# Patient Record
Sex: FEMALE | Race: WHITE | HISPANIC OR LATINO | Employment: FULL TIME | URBAN - METROPOLITAN AREA
[De-identification: names, ages, dates, MRNs, and addresses within clinical notes are randomized per-mention and may not be internally consistent; named-entity substitution may affect disease eponyms.]

---

## 2021-10-21 ENCOUNTER — OFFICE VISIT (OUTPATIENT)
Dept: OBGYN CLINIC | Age: 46
End: 2021-10-21
Payer: COMMERCIAL

## 2021-10-21 VITALS
HEIGHT: 65 IN | DIASTOLIC BLOOD PRESSURE: 84 MMHG | BODY MASS INDEX: 34.99 KG/M2 | WEIGHT: 210 LBS | SYSTOLIC BLOOD PRESSURE: 122 MMHG

## 2021-10-21 DIAGNOSIS — Z12.31 SCREENING MAMMOGRAM FOR BREAST CANCER: ICD-10-CM

## 2021-10-21 DIAGNOSIS — Z12.11 COLON CANCER SCREENING: ICD-10-CM

## 2021-10-21 DIAGNOSIS — Z01.411 ENCOUNTER FOR GYNECOLOGICAL EXAMINATION WITH ABNORMAL FINDING: Primary | ICD-10-CM

## 2021-10-21 DIAGNOSIS — N89.8 VAGINAL ODOR: ICD-10-CM

## 2021-10-21 PROCEDURE — G0476 HPV COMBO ASSAY CA SCREEN: HCPCS | Performed by: NURSE PRACTITIONER

## 2021-10-21 PROCEDURE — G0145 SCR C/V CYTO,THINLAYER,RESCR: HCPCS | Performed by: PATHOLOGY

## 2021-10-21 PROCEDURE — G0124 SCREEN C/V THIN LAYER BY MD: HCPCS | Performed by: PATHOLOGY

## 2021-10-21 PROCEDURE — 99386 PREV VISIT NEW AGE 40-64: CPT | Performed by: NURSE PRACTITIONER

## 2021-10-21 RX ORDER — ADALIMUMAB 40MG/0.4ML
KIT SUBCUTANEOUS
COMMUNITY
Start: 2021-09-30 | End: 2022-05-02

## 2021-10-21 RX ORDER — METRONIDAZOLE 7.5 MG/G
1 GEL VAGINAL
Qty: 45 G | Refills: 0 | Status: SHIPPED | OUTPATIENT
Start: 2021-10-21 | End: 2021-10-26

## 2021-10-22 LAB
HPV HR 12 DNA CVX QL NAA+PROBE: NEGATIVE
HPV16 DNA CVX QL NAA+PROBE: NEGATIVE
HPV18 DNA CVX QL NAA+PROBE: NEGATIVE

## 2021-10-28 ENCOUNTER — TELEPHONE (OUTPATIENT)
Dept: OBGYN CLINIC | Facility: CLINIC | Age: 46
End: 2021-10-28

## 2021-10-28 LAB
LAB AP GYN PRIMARY INTERPRETATION: ABNORMAL
PATH INTERP SPEC-IMP: ABNORMAL

## 2021-12-28 ENCOUNTER — OFFICE VISIT (OUTPATIENT)
Dept: URGENT CARE | Facility: CLINIC | Age: 46
End: 2021-12-28
Payer: COMMERCIAL

## 2021-12-28 ENCOUNTER — APPOINTMENT (OUTPATIENT)
Dept: RADIOLOGY | Facility: CLINIC | Age: 46
End: 2021-12-28
Payer: COMMERCIAL

## 2021-12-28 VITALS
RESPIRATION RATE: 22 BRPM | HEIGHT: 65 IN | HEART RATE: 78 BPM | OXYGEN SATURATION: 95 % | WEIGHT: 210 LBS | BODY MASS INDEX: 34.99 KG/M2 | TEMPERATURE: 99.4 F

## 2021-12-28 DIAGNOSIS — R05.9 COUGH: Primary | ICD-10-CM

## 2021-12-28 DIAGNOSIS — R05.9 COUGH: ICD-10-CM

## 2021-12-28 DIAGNOSIS — R06.02 SHORTNESS OF BREATH: ICD-10-CM

## 2021-12-28 PROCEDURE — 87636 SARSCOV2 & INF A&B AMP PRB: CPT

## 2021-12-28 PROCEDURE — 99213 OFFICE O/P EST LOW 20 MIN: CPT

## 2021-12-28 PROCEDURE — 71046 X-RAY EXAM CHEST 2 VIEWS: CPT

## 2021-12-28 RX ORDER — ALBUTEROL SULFATE 2.5 MG/3ML
2.5 SOLUTION RESPIRATORY (INHALATION) EVERY 4 HOURS PRN
COMMUNITY
Start: 2021-12-23 | End: 2022-12-23

## 2021-12-28 RX ORDER — ALBUTEROL SULFATE 90 UG/1
2 AEROSOL, METERED RESPIRATORY (INHALATION) EVERY 6 HOURS PRN
COMMUNITY
Start: 2021-12-23 | End: 2022-01-19 | Stop reason: SDUPTHER

## 2021-12-28 RX ORDER — BENZONATATE 100 MG/1
100 CAPSULE ORAL 3 TIMES DAILY PRN
Qty: 20 CAPSULE | Refills: 0 | Status: SHIPPED | OUTPATIENT
Start: 2021-12-28 | End: 2022-01-19

## 2021-12-28 RX ORDER — BUDESONIDE 180 UG/1
2 AEROSOL, POWDER RESPIRATORY (INHALATION) 2 TIMES DAILY
Qty: 1 EACH | Refills: 0 | Status: SHIPPED | OUTPATIENT
Start: 2021-12-28 | End: 2022-01-19

## 2021-12-30 RX ORDER — MOMETASONE FUROATE 220 UG/1
INHALANT RESPIRATORY (INHALATION)
Qty: 1 EACH | Refills: 0 | OUTPATIENT
Start: 2021-12-30

## 2021-12-31 ENCOUNTER — TELEPHONE (OUTPATIENT)
Dept: URGENT CARE | Facility: CLINIC | Age: 46
End: 2021-12-31

## 2021-12-31 LAB
FLUAV RNA RESP QL NAA+PROBE: NEGATIVE
FLUBV RNA RESP QL NAA+PROBE: NEGATIVE
SARS-COV-2 RNA RESP QL NAA+PROBE: POSITIVE

## 2022-01-03 ENCOUNTER — APPOINTMENT (EMERGENCY)
Dept: CT IMAGING | Facility: HOSPITAL | Age: 47
End: 2022-01-03
Payer: COMMERCIAL

## 2022-01-03 ENCOUNTER — HOSPITAL ENCOUNTER (EMERGENCY)
Facility: HOSPITAL | Age: 47
Discharge: HOME | End: 2022-01-03
Attending: EMERGENCY MEDICINE | Admitting: EMERGENCY MEDICINE
Payer: COMMERCIAL

## 2022-01-03 VITALS
TEMPERATURE: 101 F | HEART RATE: 91 BPM | DIASTOLIC BLOOD PRESSURE: 68 MMHG | RESPIRATION RATE: 20 BRPM | OXYGEN SATURATION: 94 % | SYSTOLIC BLOOD PRESSURE: 110 MMHG

## 2022-01-03 DIAGNOSIS — U07.1 COVID-19: Primary | ICD-10-CM

## 2022-01-03 DIAGNOSIS — J45.901 ASTHMA EXACERBATION: ICD-10-CM

## 2022-01-03 DIAGNOSIS — R07.9 CHEST PAIN: ICD-10-CM

## 2022-01-03 LAB
2HR DELTA HS TROPONIN: 1 NG/L
ALBUMIN SERPL BCP-MCNC: 3 G/DL (ref 3.5–5)
ALP SERPL-CCNC: 86 U/L (ref 46–116)
ALT SERPL W P-5'-P-CCNC: 36 U/L (ref 12–78)
ANION GAP SERPL CALCULATED.3IONS-SCNC: 11 MMOL/L (ref 4–13)
AST SERPL W P-5'-P-CCNC: 48 U/L (ref 5–45)
BASOPHILS # BLD MANUAL: 0 THOUSAND/UL (ref 0–0.1)
BASOPHILS NFR MAR MANUAL: 0 % (ref 0–1)
BILIRUB SERPL-MCNC: 0.32 MG/DL (ref 0.2–1)
BUN SERPL-MCNC: 6 MG/DL (ref 5–25)
CALCIUM ALBUM COR SERPL-MCNC: 8.9 MG/DL (ref 8.3–10.1)
CALCIUM SERPL-MCNC: 8.1 MG/DL (ref 8.3–10.1)
CARDIAC TROPONIN I PNL SERPL HS: 4 NG/L
CARDIAC TROPONIN I PNL SERPL HS: 5 NG/L
CHLORIDE SERPL-SCNC: 101 MMOL/L (ref 100–108)
CO2 SERPL-SCNC: 27 MMOL/L (ref 21–32)
CREAT SERPL-MCNC: 0.87 MG/DL (ref 0.6–1.3)
D DIMER PPP FEU-MCNC: 0.87 UG/ML FEU
EOSINOPHIL # BLD MANUAL: 0.12 THOUSAND/UL (ref 0–0.4)
EOSINOPHIL NFR BLD MANUAL: 2 % (ref 0–6)
ERYTHROCYTE [DISTWIDTH] IN BLOOD BY AUTOMATED COUNT: 13.2 % (ref 11.6–15.1)
GFR SERPL CREATININE-BSD FRML MDRD: 79 ML/MIN/1.73SQ M
GLUCOSE SERPL-MCNC: 104 MG/DL (ref 65–140)
HCT VFR BLD AUTO: 38.3 % (ref 34.8–46.1)
HGB BLD-MCNC: 12.8 G/DL (ref 11.5–15.4)
LYMPHOCYTES # BLD AUTO: 0.49 THOUSAND/UL (ref 0.6–4.47)
LYMPHOCYTES # BLD AUTO: 8 % (ref 14–44)
MCH RBC QN AUTO: 29.7 PG (ref 26.8–34.3)
MCHC RBC AUTO-ENTMCNC: 33.4 G/DL (ref 31.4–37.4)
MCV RBC AUTO: 89 FL (ref 82–98)
MONOCYTES # BLD AUTO: 0.25 THOUSAND/UL (ref 0–1.22)
MONOCYTES NFR BLD: 4 % (ref 4–12)
NEUTROPHILS # BLD MANUAL: 5.31 THOUSAND/UL (ref 1.85–7.62)
NEUTS BAND NFR BLD MANUAL: 2 % (ref 0–8)
NEUTS SEG NFR BLD AUTO: 84 % (ref 43–75)
PLATELET # BLD AUTO: 179 THOUSANDS/UL (ref 149–390)
PLATELET BLD QL SMEAR: ADEQUATE
PMV BLD AUTO: 9.7 FL (ref 8.9–12.7)
POTASSIUM SERPL-SCNC: 3.4 MMOL/L (ref 3.5–5.3)
PROT SERPL-MCNC: 7 G/DL (ref 6.4–8.2)
RBC # BLD AUTO: 4.31 MILLION/UL (ref 3.81–5.12)
SODIUM SERPL-SCNC: 139 MMOL/L (ref 136–145)
WBC # BLD AUTO: 6.17 THOUSAND/UL (ref 4.31–10.16)

## 2022-01-03 PROCEDURE — 85027 COMPLETE CBC AUTOMATED: CPT | Performed by: EMERGENCY MEDICINE

## 2022-01-03 PROCEDURE — 93005 ELECTROCARDIOGRAM TRACING: CPT

## 2022-01-03 PROCEDURE — 71275 CT ANGIOGRAPHY CHEST: CPT

## 2022-01-03 PROCEDURE — 99285 EMERGENCY DEPT VISIT HI MDM: CPT

## 2022-01-03 PROCEDURE — 84484 ASSAY OF TROPONIN QUANT: CPT | Performed by: EMERGENCY MEDICINE

## 2022-01-03 PROCEDURE — 96361 HYDRATE IV INFUSION ADD-ON: CPT

## 2022-01-03 PROCEDURE — 85007 BL SMEAR W/DIFF WBC COUNT: CPT | Performed by: EMERGENCY MEDICINE

## 2022-01-03 PROCEDURE — 99285 EMERGENCY DEPT VISIT HI MDM: CPT | Performed by: PHYSICIAN ASSISTANT

## 2022-01-03 PROCEDURE — 85379 FIBRIN DEGRADATION QUANT: CPT | Performed by: PHYSICIAN ASSISTANT

## 2022-01-03 PROCEDURE — 96376 TX/PRO/DX INJ SAME DRUG ADON: CPT

## 2022-01-03 PROCEDURE — 36415 COLL VENOUS BLD VENIPUNCTURE: CPT

## 2022-01-03 PROCEDURE — 80053 COMPREHEN METABOLIC PANEL: CPT | Performed by: EMERGENCY MEDICINE

## 2022-01-03 PROCEDURE — 96374 THER/PROPH/DIAG INJ IV PUSH: CPT

## 2022-01-03 RX ORDER — POTASSIUM CHLORIDE 20 MEQ/1
20 TABLET, EXTENDED RELEASE ORAL ONCE
Status: COMPLETED | OUTPATIENT
Start: 2022-01-03 | End: 2022-01-03

## 2022-01-03 RX ORDER — ALBUTEROL SULFATE 2.5 MG/3ML
2.5 SOLUTION RESPIRATORY (INHALATION) EVERY 6 HOURS PRN
Qty: 75 ML | Refills: 0 | Status: SHIPPED | OUTPATIENT
Start: 2022-01-03 | End: 2022-01-19 | Stop reason: SDUPTHER

## 2022-01-03 RX ORDER — KETOROLAC TROMETHAMINE 30 MG/ML
15 INJECTION, SOLUTION INTRAMUSCULAR; INTRAVENOUS ONCE
Status: COMPLETED | OUTPATIENT
Start: 2022-01-03 | End: 2022-01-03

## 2022-01-03 RX ORDER — ACETAMINOPHEN 325 MG/1
975 TABLET ORAL ONCE
Status: COMPLETED | OUTPATIENT
Start: 2022-01-03 | End: 2022-01-03

## 2022-01-03 RX ORDER — ALBUTEROL SULFATE 90 UG/1
2 AEROSOL, METERED RESPIRATORY (INHALATION) ONCE
Status: COMPLETED | OUTPATIENT
Start: 2022-01-03 | End: 2022-01-03

## 2022-01-03 RX ORDER — ONDANSETRON 4 MG/1
4 TABLET, ORALLY DISINTEGRATING ORAL ONCE
Status: COMPLETED | OUTPATIENT
Start: 2022-01-03 | End: 2022-01-03

## 2022-01-03 RX ORDER — PREDNISONE 50 MG/1
50 TABLET ORAL DAILY
Qty: 4 TABLET | Refills: 0 | Status: SHIPPED | OUTPATIENT
Start: 2022-01-04 | End: 2022-01-08

## 2022-01-03 RX ADMIN — POTASSIUM CHLORIDE 20 MEQ: 1500 TABLET, EXTENDED RELEASE ORAL at 09:40

## 2022-01-03 RX ADMIN — IOHEXOL 100 ML: 350 INJECTION, SOLUTION INTRAVENOUS at 09:52

## 2022-01-03 RX ADMIN — KETOROLAC TROMETHAMINE 15 MG: 30 INJECTION, SOLUTION INTRAMUSCULAR at 09:40

## 2022-01-03 RX ADMIN — ONDANSETRON 4 MG: 4 TABLET, ORALLY DISINTEGRATING ORAL at 06:19

## 2022-01-03 RX ADMIN — ALBUTEROL SULFATE 2 PUFF: 90 AEROSOL, METERED RESPIRATORY (INHALATION) at 10:03

## 2022-01-03 RX ADMIN — PREDNISONE 50 MG: 20 TABLET ORAL at 10:39

## 2022-01-03 RX ADMIN — SODIUM CHLORIDE 1000 ML: 0.9 INJECTION, SOLUTION INTRAVENOUS at 09:25

## 2022-01-03 RX ADMIN — KETOROLAC TROMETHAMINE 15 MG: 30 INJECTION, SOLUTION INTRAMUSCULAR at 10:40

## 2022-01-03 RX ADMIN — ACETAMINOPHEN 975 MG: 325 TABLET, FILM COATED ORAL at 10:39

## 2022-01-03 NOTE — ED PROVIDER NOTES
History  Chief Complaint   Patient presents with    Chest Pain     pt c/o worsening cp since last night, pt covid symptoms started 12/26  pt also c/o body aches and fevers at home  pt took neb tx and advil at 64     51yo female with a history of asthma and rheumatoid arthritis currently untreated presenting for evaluation of chest pain  Patient was diagnosed with COVID-19 on 12/28/21  She reports continuing symptoms including fevers, chills, and body aches  Patient also reports intermittent left sided chest pain which feels like a muscle spasm  She denies any alleviating or provoking factors  Patient also reports a dry cough and shortness of breath  Due to her worsening symptoms, patient decided to seek medical attention  She did not receive the COVID vaccine  History provided by:  Medical records and patient   used: No    Chest Pain  Pain location:  L chest  Pain quality comment:  Spasm  Pain radiates to:  Does not radiate  Pain severity:  Moderate  Onset quality:  Gradual  Timing:  Intermittent  Progression:  Worsening  Chronicity:  New  Relieved by:  Nothing  Worsened by:  Nothing tried  Associated symptoms: anxiety, cough, fatigue, fever, shortness of breath and weakness    Associated symptoms: no abdominal pain, no altered mental status, no diaphoresis, no lower extremity edema, no nausea, no numbness, no palpitations, no syncope and not vomiting        Prior to Admission Medications   Prescriptions Last Dose Informant Patient Reported? Taking?    Humira Pen 40 MG/0 4ML PNKT   Yes No   Patient not taking: Reported on 12/28/2021    albuterol (2 5 mg/3 mL) 0 083 % nebulizer solution   Yes No   Sig: Inhale 2 5 mg every 4 (four) hours as needed   albuterol (PROVENTIL HFA,VENTOLIN HFA) 90 mcg/act inhaler   Yes No   Sig: Inhale 2 puffs every 6 (six) hours as needed   benzonatate (TESSALON PERLES) 100 mg capsule   No No   Sig: Take 1 capsule (100 mg total) by mouth 3 (three) times a day as needed for cough   budesonide (Pulmicort Flexhaler) 180 MCG/ACT inhaler   No No   Sig: Inhale 2 puffs 2 (two) times a day for 14 days Rinse mouth after use  Facility-Administered Medications: None       History reviewed  No pertinent past medical history  Past Surgical History:   Procedure Laterality Date    STOMACH SURGERY      TONSILLECTOMY      TUBAL LIGATION         Family History   Problem Relation Age of Onset    Breast cancer Mother         Masectomy    Breast cancer Maternal Aunt         Ymph node removal     I have reviewed and agree with the history as documented  E-Cigarette/Vaping    E-Cigarette Use Never User      E-Cigarette/Vaping Substances    Nicotine No     THC No     CBD No     Flavoring No     Other No     Unknown No      Social History     Tobacco Use    Smoking status: Former Smoker    Smokeless tobacco: Current User   Vaping Use    Vaping Use: Never used   Substance Use Topics    Alcohol use: Yes    Drug use: Not Currently       Review of Systems   Constitutional: Positive for activity change, appetite change, chills, fatigue and fever  Negative for diaphoresis  HENT: Positive for congestion  Negative for drooling and voice change  Eyes: Negative for discharge and redness  Respiratory: Positive for cough and shortness of breath  Negative for stridor  Cardiovascular: Positive for chest pain  Negative for palpitations, leg swelling and syncope  Gastrointestinal: Negative for abdominal pain, nausea and vomiting  Musculoskeletal: Negative for neck pain and neck stiffness  Skin: Negative for color change and rash  Neurological: Positive for weakness  Negative for seizures, syncope and numbness  Psychiatric/Behavioral: Negative for confusion  The patient is not nervous/anxious  All other systems reviewed and are negative  Physical Exam  Physical Exam  Vitals and nursing note reviewed     Constitutional:       General: She is not in acute distress  Appearance: She is well-developed  She is not diaphoretic  Comments: Non-toxic appearing   HENT:      Head: Normocephalic and atraumatic  Right Ear: External ear normal       Left Ear: External ear normal       Nose: Nose normal    Eyes:      General: No scleral icterus  Right eye: No discharge  Left eye: No discharge  Conjunctiva/sclera: Conjunctivae normal    Cardiovascular:      Rate and Rhythm: Normal rate and regular rhythm  Heart sounds: Normal heart sounds  No murmur heard  Pulmonary:      Effort: Pulmonary effort is normal  No respiratory distress  Breath sounds: No stridor  Wheezing present  No rales  Comments: Frequent dry cough  Abdominal:      General: Bowel sounds are normal  There is no distension  Palpations: Abdomen is soft  Tenderness: There is no abdominal tenderness  There is no guarding  Musculoskeletal:         General: No deformity  Normal range of motion  Cervical back: Normal range of motion and neck supple  Lymphadenopathy:      Cervical: No cervical adenopathy  Skin:     General: Skin is warm and dry  Neurological:      Mental Status: She is alert  She is not disoriented  GCS: GCS eye subscore is 4  GCS verbal subscore is 5  GCS motor subscore is 6     Psychiatric:         Behavior: Behavior normal          Vital Signs  ED Triage Vitals   Temperature Pulse Respirations Blood Pressure SpO2   01/03/22 0609 01/03/22 0604 01/03/22 0608 01/03/22 0608 01/03/22 0604   (!) 101 °F (38 3 °C) 103 20 129/73 93 %      Temp Source Heart Rate Source Patient Position - Orthostatic VS BP Location FiO2 (%)   01/03/22 0609 01/03/22 0608 01/03/22 0608 01/03/22 0608 --   Oral Monitor Sitting Left arm       Pain Score       01/03/22 0940       8           Vitals:    01/03/22 0604 01/03/22 0608 01/03/22 1054   BP:  129/73 110/68   Pulse: 103 104 91   Patient Position - Orthostatic VS:  Sitting          Visual Acuity      ED Medications  Medications   ondansetron (ZOFRAN-ODT) dispersible tablet 4 mg (4 mg Oral Given 1/3/22 0619)   potassium chloride (K-DUR,KLOR-CON) CR tablet 20 mEq (20 mEq Oral Given 1/3/22 0940)   sodium chloride 0 9 % bolus 1,000 mL (0 mL Intravenous Stopped 1/3/22 1054)   ketorolac (TORADOL) injection 15 mg (15 mg Intravenous Given 1/3/22 0940)   albuterol (PROVENTIL HFA,VENTOLIN HFA) inhaler 2 puff (2 puffs Inhalation Given 1/3/22 1003)   iohexol (OMNIPAQUE) 350 MG/ML injection (SINGLE-DOSE) 100 mL (100 mL Intravenous Given 1/3/22 0952)   ketorolac (TORADOL) injection 15 mg (15 mg Intravenous Given 1/3/22 1040)   acetaminophen (TYLENOL) tablet 975 mg (975 mg Oral Given 1/3/22 1039)   predniSONE tablet 50 mg (50 mg Oral Given 1/3/22 1039)       Diagnostic Studies  Results Reviewed     Procedure Component Value Units Date/Time    HS Troponin I 2hr [[id number]] Collected: 01/03/22 0925    Lab Status: Final result Specimen: Blood from Arm, Left Updated: 01/03/22 0955     hs TnI 2hr 5 ng/L      Delta 2hr hsTnI 1 ng/L     D-dimer, quantitative [[id number]]  (Abnormal) Collected: 01/03/22 0618    Lab Status: Final result Specimen: Blood from Arm, Left Updated: 01/03/22 0923     D-Dimer, Quant 0 87 ug/ml FEU     CBC and differential [[id number]]  (Normal) Collected: 01/03/22 0618    Lab Status: Final result Specimen: Blood from Arm, Left Updated: 01/03/22 0701     WBC 6 17 Thousand/uL      RBC 4 31 Million/uL      Hemoglobin 12 8 g/dL      Hematocrit 38 3 %      MCV 89 fL      MCH 29 7 pg      MCHC 33 4 g/dL      RDW 13 2 %      MPV 9 7 fL      Platelets 436 Thousands/uL     Narrative: This is an appended report  These results have been appended to a previously verified report      Manual Differential(PHLEBS Do Not Order) [[id number]]  (Abnormal) Collected: 01/03/22 0618    Lab Status: Final result Specimen: Blood from Arm, Left Updated: 01/03/22 0701     Segmented % 84 %      Bands % 2 %      Lymphocytes % 8 % Monocytes % 4 %      Eosinophils, % 2 %      Basophils % 0 %      Absolute Neutrophils 5 31 Thousand/uL      Lymphocytes Absolute 0 49 Thousand/uL      Monocytes Absolute 0 25 Thousand/uL      Eosinophils Absolute 0 12 Thousand/uL      Basophils Absolute 0 00 Thousand/uL      Total Counted --     Platelet Estimate Adequate    HS Troponin 0hr (reflex protocol) [[id number]]  (Normal) Collected: 01/03/22 0618    Lab Status: Final result Specimen: Blood from Arm, Left Updated: 01/03/22 0652     hs TnI 0hr 4 ng/L     Comprehensive metabolic panel [[id number]]  (Abnormal) Collected: 01/03/22 0618    Lab Status: Final result Specimen: Blood from Arm, Left Updated: 01/03/22 0645     Sodium 139 mmol/L      Potassium 3 4 mmol/L      Chloride 101 mmol/L      CO2 27 mmol/L      ANION GAP 11 mmol/L      BUN 6 mg/dL      Creatinine 0 87 mg/dL      Glucose 104 mg/dL      Calcium 8 1 mg/dL      Corrected Calcium 8 9 mg/dL      AST 48 U/L      ALT 36 U/L      Alkaline Phosphatase 86 U/L      Total Protein 7 0 g/dL      Albumin 3 0 g/dL      Total Bilirubin 0 32 mg/dL      eGFR 79 ml/min/1 73sq m     Narrative:      [place] guidelines for Chronic Kidney Disease (CKD):     Stage 1 with normal or high GFR (GFR > 90 mL/min/1 73 square meters)    Stage 2 Mild CKD (GFR = 60-89 mL/min/1 73 square meters)    Stage 3A Moderate CKD (GFR = 45-59 mL/min/1 73 square meters)    Stage 3B Moderate CKD (GFR = 30-44 mL/min/1 73 square meters)    Stage 4 Severe CKD (GFR = 15-29 mL/min/1 73 square meters)    Stage 5 End Stage CKD (GFR <15 mL/min/1 73 square meters)  Note: GFR calculation is accurate only with a steady state creatinine                 CTA ED chest PE study   Final Result by Josephine Saravia MD (01/03 1014)      No pulmonary embolus  Moderate bilateral nodular predominantly peripheral groundglass opacity and mild right lower lobe paraspinal consolidation due to Covid-19        Mild mediastinal and right infrahilar lymphadenopathy, likely reactive  Trace right effusion  Workstation performed: [id number]                    Procedures  ECG 12 Lead Documentation Only    Date/Time: 1/3/2022 3:36 PM  Performed by: Melissa Flores PA-C  Authorized by: Melissa Flores PA-C     Indications / Diagnosis:  CP  ECG reviewed by me, the ED Provider: yes    Patient location:  ED  Interpretation:     Interpretation: normal    Rate:     ECG rate:  99    ECG rate assessment: normal    Rhythm:     Rhythm: sinus rhythm    Ectopy:     Ectopy: none    QRS:     QRS axis:  Normal  Conduction:     Conduction: normal    ST segments:     ST segments:  Normal  T waves:     T waves: normal               ED Course                     MDM  Number of Diagnoses or Management Options  Asthma exacerbation: new and requires workup  Chest pain: new and requires workup  COVID-19: new and requires workup  Diagnosis management comments: 52yo female presenting for chest pain and URI symptoms  Tested positive for COVID last week  C/o worsening symptoms  She is febrile to 101 on arrival  Oxygen saturation in mid 90s  Patient is non-toxic appearing  She has wheezing on exam but pulmonary effort is normal  Differential diagnosis includes but is not limited to: COVID, pneumonia, asthma exacerbation, ACS, PE    Initial ED plan: Cardiac labs checked in triage  High sensitivity troponin is normal  Potassium is mildly low at 3 4 which was repleted  White count normal and renal function at baseline  EKG has no ischemic changes  Will repeat troponin and add on D-dimer  IV fluid bolus and Toradol for body aches/fever  Final assessment: Repeat troponin unchanged  D-dimer elevated and CTA chest subsequently ordered  No evidence of pulmonary embolism on CT  There is evidence of COVID pneumonia  No episodes of hypoxia while in ED  No indication for admission at this time  She was started on a course of prednisone   Advised close PCP follow-up  ED return precautions discussed  Patient expressed understanding and is agreeable to plan  Patient discharged in stable condition  Amount and/or Complexity of Data Reviewed  Clinical lab tests: ordered and reviewed  Tests in the radiology section of CPT®: ordered and reviewed  Tests in the medicine section of CPT®: ordered and reviewed  Review and summarize past medical records: yes  Independent visualization of images, tracings, or specimens: yes    Risk of Complications, Morbidity, and/or Mortality  Presenting problems: moderate  Diagnostic procedures: moderate  Management options: moderate    Patient Progress  Patient progress: stable      Disposition  Final diagnoses:   COVID-19   Chest pain   Asthma exacerbation     Time reflects when diagnosis was documented in both MDM as applicable and the Disposition within this note     Time User Action Codes Description Comment    1/3/2022 10:22 AM [facility] [employer] [place], [place] [[id number]] COVID-19     [facility]2022 10:[id number][facility] Chest pain     1/3/2022 10:23 AM [facility] [employer] [facility] [place] [[id number]] Asthma exacerbation       ED Disposition     ED Disposition Condition Date/Time Comment    Discharge Stable Mon Erasto 3, 2022 10:22 AM Rachelle Martinez discharge to home/self care              Follow-up Information     Follow up With Specialties Details Why Contact Info Additional Information    Kristin Pak MD Internal Medicine Schedule an appointment as soon as possible for a visit   [address] [address] Emergency Department Emergency Medicine  If symptoms worsen [address] Emergency Department, [address] [place] [address], [address]          Discharge Medication List as of 1/3/2022 10:25 AM      START taking these medications    Details   !! albuterol (2 5 mg/3 mL) 0 083 % nebulizer solution Take 3 mL (2 5 mg total) by nebulization every 6 (six) hours as needed for wheezing or shortness of breath, Starting Mon 1/3/2022, Normal      predniSONE 50 mg tablet Take 1 tablet (50 mg total) by mouth daily for 4 days, Starting Tue 1/4/2022, Until Sat 1/8/2022, Normal       !! - Potential duplicate medications found  Please discuss with provider  CONTINUE these medications which have NOT CHANGED    Details   !! albuterol (2 5 mg/3 mL) 0 083 % nebulizer solution Inhale 2 5 mg every 4 (four) hours as needed, Starting Thu 12/23/2021, Until Fri 12/23/2022 at 2359, Historical Med      albuterol (PROVENTIL HFA,VENTOLIN HFA) 90 mcg/act inhaler Inhale 2 puffs every 6 (six) hours as needed, Starting Thu 12/23/2021, Until Fri 12/23/2022 at 2359, Historical Med      benzonatate (TESSALON PERLES) 100 mg capsule Take 1 capsule (100 mg total) by mouth 3 (three) times a day as needed for cough, Starting Tue 12/28/2021, Normal      budesonide (Pulmicort Flexhaler) 180 MCG/ACT inhaler Inhale 2 puffs 2 (two) times a day for 14 days Rinse mouth after use , Starting Tue 12/28/2021, Until Tue 1/11/2022, Normal      Humira Pen 40 MG/0 4ML PNKT Starting Thu 9/30/2021, Historical Med       !! - Potential duplicate medications found  Please discuss with provider  No discharge procedures on file      PDMP Review     None          ED Provider  Electronically Signed by           Jeny Campbell PA-C  01/03/22 5119

## 2022-01-03 NOTE — DISCHARGE INSTRUCTIONS
Take prednisone as prescribed  Continue using your inhaler and nebulizer treatments as needed for wheezing  Please follow-up with your family doctor  Return to the ER with any worsening symptoms

## 2022-01-04 LAB
ATRIAL RATE: 99 BPM
P AXIS: 58 DEGREES
PR INTERVAL: 142 MS
QRS AXIS: 61 DEGREES
QRSD INTERVAL: 70 MS
QT INTERVAL: 348 MS
QTC INTERVAL: 446 MS
T WAVE AXIS: 67 DEGREES
VENTRICULAR RATE: 99 BPM

## 2022-01-04 PROCEDURE — 93010 ELECTROCARDIOGRAM REPORT: CPT | Performed by: INTERNAL MEDICINE

## 2022-01-19 ENCOUNTER — OFFICE VISIT (OUTPATIENT)
Dept: INTERNAL MEDICINE CLINIC | Facility: CLINIC | Age: 47
End: 2022-01-19
Payer: COMMERCIAL

## 2022-01-19 ENCOUNTER — APPOINTMENT (OUTPATIENT)
Dept: LAB | Facility: CLINIC | Age: 47
End: 2022-01-19
Payer: COMMERCIAL

## 2022-01-19 VITALS
TEMPERATURE: 98.5 F | BODY MASS INDEX: 36.06 KG/M2 | OXYGEN SATURATION: 97 % | HEIGHT: 64 IN | SYSTOLIC BLOOD PRESSURE: 118 MMHG | HEART RATE: 93 BPM | DIASTOLIC BLOOD PRESSURE: 70 MMHG | WEIGHT: 211.2 LBS

## 2022-01-19 DIAGNOSIS — Z00.00 HEALTHCARE MAINTENANCE: ICD-10-CM

## 2022-01-19 DIAGNOSIS — J45.20 MILD INTERMITTENT ASTHMA WITHOUT COMPLICATION: Primary | ICD-10-CM

## 2022-01-19 DIAGNOSIS — Z12.31 ENCOUNTER FOR SCREENING MAMMOGRAM FOR BREAST CANCER: ICD-10-CM

## 2022-01-19 DIAGNOSIS — J45.20 MILD INTERMITTENT ASTHMA WITHOUT COMPLICATION: ICD-10-CM

## 2022-01-19 DIAGNOSIS — J45.901 ASTHMA EXACERBATION: ICD-10-CM

## 2022-01-19 DIAGNOSIS — M47.819 SPONDYLOARTHROPATHY: Primary | ICD-10-CM

## 2022-01-19 DIAGNOSIS — M47.819 SPONDYLOARTHROPATHY: ICD-10-CM

## 2022-01-19 DIAGNOSIS — Z12.11 COLON CANCER SCREENING: ICD-10-CM

## 2022-01-19 LAB
ALBUMIN SERPL BCP-MCNC: 3.5 G/DL (ref 3.5–5)
ALP SERPL-CCNC: 93 U/L (ref 46–116)
ALT SERPL W P-5'-P-CCNC: 28 U/L (ref 12–78)
ANION GAP SERPL CALCULATED.3IONS-SCNC: 4 MMOL/L (ref 4–13)
AST SERPL W P-5'-P-CCNC: 17 U/L (ref 5–45)
BACTERIA UR QL AUTO: NORMAL /HPF
BASOPHILS # BLD AUTO: 0.06 THOUSANDS/ΜL (ref 0–0.1)
BASOPHILS NFR BLD AUTO: 1 % (ref 0–1)
BILIRUB SERPL-MCNC: 0.48 MG/DL (ref 0.2–1)
BILIRUB UR QL STRIP: NEGATIVE
BUN SERPL-MCNC: 13 MG/DL (ref 5–25)
CALCIUM SERPL-MCNC: 9.3 MG/DL (ref 8.3–10.1)
CHLORIDE SERPL-SCNC: 106 MMOL/L (ref 100–108)
CHOLEST SERPL-MCNC: 199 MG/DL
CLARITY UR: NORMAL
CO2 SERPL-SCNC: 28 MMOL/L (ref 21–32)
COLOR UR: YELLOW
CREAT SERPL-MCNC: 0.97 MG/DL (ref 0.6–1.3)
CRP SERPL HS-MCNC: 25.7 MG/L
EOSINOPHIL # BLD AUTO: 0.33 THOUSAND/ΜL (ref 0–0.61)
EOSINOPHIL NFR BLD AUTO: 4 % (ref 0–6)
ERYTHROCYTE [DISTWIDTH] IN BLOOD BY AUTOMATED COUNT: 13.8 % (ref 11.6–15.1)
GFR SERPL CREATININE-BSD FRML MDRD: 69 ML/MIN/1.73SQ M
GLUCOSE SERPL-MCNC: 103 MG/DL (ref 65–140)
GLUCOSE UR STRIP-MCNC: NEGATIVE MG/DL
HCT VFR BLD AUTO: 38.5 % (ref 34.8–46.1)
HDLC SERPL-MCNC: 56 MG/DL
HGB BLD-MCNC: 12.3 G/DL (ref 11.5–15.4)
HGB UR QL STRIP.AUTO: NORMAL
HYALINE CASTS #/AREA URNS LPF: NORMAL /LPF
IMM GRANULOCYTES # BLD AUTO: 0.04 THOUSAND/UL (ref 0–0.2)
IMM GRANULOCYTES NFR BLD AUTO: 1 % (ref 0–2)
KETONES UR STRIP-MCNC: NEGATIVE MG/DL
LDLC SERPL CALC-MCNC: 112 MG/DL (ref 0–100)
LEUKOCYTE ESTERASE UR QL STRIP: NEGATIVE
LYMPHOCYTES # BLD AUTO: 2.16 THOUSANDS/ΜL (ref 0.6–4.47)
LYMPHOCYTES NFR BLD AUTO: 24 % (ref 14–44)
MCH RBC QN AUTO: 29.7 PG (ref 26.8–34.3)
MCHC RBC AUTO-ENTMCNC: 31.9 G/DL (ref 31.4–37.4)
MCV RBC AUTO: 93 FL (ref 82–98)
MONOCYTES # BLD AUTO: 0.81 THOUSAND/ΜL (ref 0.17–1.22)
MONOCYTES NFR BLD AUTO: 9 % (ref 4–12)
NEUTROPHILS # BLD AUTO: 5.45 THOUSANDS/ΜL (ref 1.85–7.62)
NEUTS SEG NFR BLD AUTO: 61 % (ref 43–75)
NITRITE UR QL STRIP: NEGATIVE
NON-SQ EPI CELLS URNS QL MICRO: NORMAL /HPF
NRBC BLD AUTO-RTO: 0 /100 WBCS
PH UR STRIP.AUTO: 6 [PH]
PLATELET # BLD AUTO: 400 THOUSANDS/UL (ref 149–390)
PMV BLD AUTO: 10.1 FL (ref 8.9–12.7)
POTASSIUM SERPL-SCNC: 3.9 MMOL/L (ref 3.5–5.3)
PROT SERPL-MCNC: 8.2 G/DL (ref 6.4–8.2)
PROT UR STRIP-MCNC: NEGATIVE MG/DL
RBC # BLD AUTO: 4.14 MILLION/UL (ref 3.81–5.12)
RBC #/AREA URNS AUTO: NORMAL /HPF
SODIUM SERPL-SCNC: 138 MMOL/L (ref 136–145)
SP GR UR STRIP.AUTO: >=1.03 (ref 1–1.03)
T4 FREE SERPL-MCNC: 0.83 NG/DL (ref 0.76–1.46)
TRIGL SERPL-MCNC: 157 MG/DL
TSH SERPL DL<=0.05 MIU/L-ACNC: 4.1 UIU/ML (ref 0.36–3.74)
UROBILINOGEN UR QL STRIP.AUTO: 0.2 E.U./DL
WBC # BLD AUTO: 8.85 THOUSAND/UL (ref 4.31–10.16)
WBC #/AREA URNS AUTO: NORMAL /HPF

## 2022-01-19 PROCEDURE — 36415 COLL VENOUS BLD VENIPUNCTURE: CPT

## 2022-01-19 PROCEDURE — 99204 OFFICE O/P NEW MOD 45 MIN: CPT | Performed by: INTERNAL MEDICINE

## 2022-01-19 PROCEDURE — 85652 RBC SED RATE AUTOMATED: CPT

## 2022-01-19 PROCEDURE — 80053 COMPREHEN METABOLIC PANEL: CPT

## 2022-01-19 PROCEDURE — 84443 ASSAY THYROID STIM HORMONE: CPT

## 2022-01-19 PROCEDURE — 86430 RHEUMATOID FACTOR TEST QUAL: CPT

## 2022-01-19 PROCEDURE — 86200 CCP ANTIBODY: CPT

## 2022-01-19 PROCEDURE — 81001 URINALYSIS AUTO W/SCOPE: CPT | Performed by: INTERNAL MEDICINE

## 2022-01-19 PROCEDURE — 85025 COMPLETE CBC W/AUTO DIFF WBC: CPT

## 2022-01-19 PROCEDURE — 80061 LIPID PANEL: CPT

## 2022-01-19 PROCEDURE — 84439 ASSAY OF FREE THYROXINE: CPT

## 2022-01-19 PROCEDURE — 86141 C-REACTIVE PROTEIN HS: CPT

## 2022-01-19 PROCEDURE — 86803 HEPATITIS C AB TEST: CPT

## 2022-01-19 PROCEDURE — 86038 ANTINUCLEAR ANTIBODIES: CPT

## 2022-01-19 NOTE — PROGRESS NOTES
BMI Counseling: Body mass index is 36 25 kg/m²  The BMI is above normal  Nutrition recommendations include decreasing portion sizes and moderation in carbohydrate intake  Exercise recommendations include moderate physical activity 150 minutes/week  Rationale for BMI follow-up plan is due to patient being overweight or obese

## 2022-01-19 NOTE — PROGRESS NOTES
Assessment/Plan:       Diagnoses and all orders for this visit:    Spondyloarthropathy  -     STEVE Screen w/ Reflex to Titer/Pattern; Future  -     Cyclic citrul peptide antibody, IgG; Future  -     High sensitivity CRP; Future  -     RF Screen w/ Reflex to Titer; Future  -     Sedimentation rate, automated; Future  -     Ambulatory Referral to Rheumatology; Future    Encounter for screening mammogram for breast cancer  -     Mammo screening bilateral w cad; Future    Healthcare maintenance  -     CBC and differential; Future  -     Comprehensive metabolic panel; Future  -     TSH, 3rd generation with Free T4 reflex; Future  -     UA (URINE) with reflex to Scope  -     Lipid Panel with Direct LDL reflex; Future  -     Hepatitis C antibody; Future    Colon cancer screening  -     Ambulatory referral for colonoscopy; Future    Mild intermittent asthma without complication                Subjective:      Patient ID: Francois Christensen is a 51 y o  female  New patient visit of this 43-year-old who carries a diagnosis of spondyloarthritis with back pain and some joint pain  She is on no specific treatment for this right now having moved to this area and not yet picked up with rheumatologist     Employed as a    No cigarettes, no excessive alcohol, no illicit drugs  No vaping  No high risk sexual behavior  Surgical history of tonsillectomy and tubal ligation     Family history of stroke and hypertension     never had a colonoscopy , and needs a mammogram      The following portions of the patient's history were reviewed and updated as appropriate:   She has no past medical history on file  ,  does not have any pertinent problems on file  ,   has a past surgical history that includes Tonsillectomy; Stomach surgery; and Tubal ligation  ,  family history includes Breast cancer in her maternal aunt and mother  ,   reports that she has quit smoking   She uses smokeless tobacco  She reports current alcohol use  She reports previous drug use ,  has No Known Allergies     Current Outpatient Medications   Medication Sig Dispense Refill    albuterol (2 5 mg/3 mL) 0 083 % nebulizer solution Take 3 mL (2 5 mg total) by nebulization every 6 (six) hours as needed for wheezing or shortness of breath 75 mL 0    albuterol (PROVENTIL HFA,VENTOLIN HFA) 90 mcg/act inhaler Inhale 2 puffs every 6 (six) hours as needed      albuterol (2 5 mg/3 mL) 0 083 % nebulizer solution Inhale 2 5 mg every 4 (four) hours as needed      Humira Pen 40 MG/0 4ML PNKT  (Patient not taking: Reported on 12/28/2021 )       No current facility-administered medications for this visit  Review of Systems   Musculoskeletal: Positive for arthralgias and back pain  All other systems reviewed and are negative  Objective:  Vitals:    01/19/22 1621   BP: 118/70   Pulse: 93   Temp: 98 5 °F (36 9 °C)   SpO2: 97%      Physical Exam  Constitutional:       Appearance: She is well-developed  She is not diaphoretic  HENT:      Head: Normocephalic and atraumatic  Nose: No rhinorrhea  Eyes:      Pupils: Pupils are equal, round, and reactive to light  Neck:      Thyroid: No thyromegaly  Trachea: No tracheal deviation  Cardiovascular:      Rate and Rhythm: Normal rate and regular rhythm  Heart sounds: Normal heart sounds  No murmur heard  No gallop  Pulmonary:      Effort: No respiratory distress  Breath sounds: No wheezing or rales  Abdominal:      General: Bowel sounds are normal       Palpations: Abdomen is soft  Tenderness: There is no abdominal tenderness  Musculoskeletal:         General: No tenderness or deformity  Normal range of motion  Cervical back: Normal range of motion and neck supple  Skin:     General: Skin is warm  Neurological:      Mental Status: She is alert and oriented to person, place, and time        Coordination: Coordination normal    Psychiatric:         Judgment: Judgment normal  Patient Instructions    A patient with the above history and physical   Screening laboratory testing  Rheumatology referral  Mammogram  Colonoscopy   Yearly follow-up

## 2022-01-19 NOTE — PATIENT INSTRUCTIONS
A patient with the above history and physical   Screening laboratory testing  Rheumatology referral  Mammogram  Colonoscopy   Yearly follow-up

## 2022-01-20 LAB
ERYTHROCYTE [SEDIMENTATION RATE] IN BLOOD: 59 MM/HOUR (ref 0–19)
HCV AB SER QL: NORMAL
RHEUMATOID FACT SER QL LA: NEGATIVE

## 2022-01-20 RX ORDER — ALBUTEROL SULFATE 2.5 MG/3ML
2.5 SOLUTION RESPIRATORY (INHALATION) EVERY 6 HOURS PRN
Qty: 75 ML | Refills: 0 | Status: SHIPPED | OUTPATIENT
Start: 2022-01-20 | End: 2022-07-28 | Stop reason: ALTCHOICE

## 2022-01-20 RX ORDER — ALBUTEROL SULFATE 90 UG/1
2 AEROSOL, METERED RESPIRATORY (INHALATION) EVERY 6 HOURS PRN
Qty: 6.7 G | Refills: 0 | Status: SHIPPED | OUTPATIENT
Start: 2022-01-20 | End: 2022-02-13

## 2022-01-21 LAB
CCP AB SER IA-ACNC: 1.6
RYE IGE QN: NEGATIVE

## 2022-02-13 DIAGNOSIS — J45.20 MILD INTERMITTENT ASTHMA WITHOUT COMPLICATION: ICD-10-CM

## 2022-02-13 RX ORDER — ALBUTEROL SULFATE 90 UG/1
AEROSOL, METERED RESPIRATORY (INHALATION)
Qty: 6.7 G | Refills: 0 | Status: SHIPPED | OUTPATIENT
Start: 2022-02-13

## 2022-02-23 ENCOUNTER — TELEPHONE (OUTPATIENT)
Dept: INTERNAL MEDICINE CLINIC | Facility: CLINIC | Age: 47
End: 2022-02-23

## 2022-02-23 NOTE — TELEPHONE ENCOUNTER
Pt's chest is tight; using her pump    Can you send prednisone in for her? So her lungs will open up     She's going to try to get an appt Sat morning with you

## 2022-02-24 DIAGNOSIS — J45.20 MILD INTERMITTENT ASTHMA WITHOUT COMPLICATION: ICD-10-CM

## 2022-02-24 NOTE — TELEPHONE ENCOUNTER
I sent in a taper prednisone but also Symbicort    She needs to use Symbicort 2 puffs twice a day a regular basis

## 2022-02-25 DIAGNOSIS — J45.20 MILD INTERMITTENT ASTHMA WITHOUT COMPLICATION: ICD-10-CM

## 2022-02-25 RX ORDER — BUDESONIDE AND FORMOTEROL FUMARATE DIHYDRATE 160; 4.5 UG/1; UG/1
AEROSOL RESPIRATORY (INHALATION)
Qty: 10.2 G | Refills: 1 | Status: SHIPPED | OUTPATIENT
Start: 2022-02-25 | End: 2022-02-26

## 2022-02-26 RX ORDER — BUDESONIDE AND FORMOTEROL FUMARATE DIHYDRATE 160; 4.5 UG/1; UG/1
2 AEROSOL RESPIRATORY (INHALATION) 2 TIMES DAILY
Qty: 10.2 G | Refills: 11 | Status: SHIPPED | OUTPATIENT
Start: 2022-02-26

## 2022-03-10 ENCOUNTER — OFFICE VISIT (OUTPATIENT)
Dept: RHEUMATOLOGY | Facility: CLINIC | Age: 47
End: 2022-03-10
Payer: COMMERCIAL

## 2022-03-10 VITALS — TEMPERATURE: 98.4 F | WEIGHT: 211 LBS | BODY MASS INDEX: 36.22 KG/M2

## 2022-03-10 DIAGNOSIS — Z79.52 CURRENT USE OF STEROID MEDICATION: ICD-10-CM

## 2022-03-10 DIAGNOSIS — Z11.1 SCREENING-PULMONARY TB: ICD-10-CM

## 2022-03-10 DIAGNOSIS — Z87.39 HISTORY OF SPONDYLOARTHROPATHY: Primary | ICD-10-CM

## 2022-03-10 DIAGNOSIS — Z79.899 LONG-TERM USE OF IMMUNOSUPPRESSANT MEDICATION: ICD-10-CM

## 2022-03-10 PROCEDURE — 99205 OFFICE O/P NEW HI 60 MIN: CPT | Performed by: INTERNAL MEDICINE

## 2022-03-10 NOTE — PROGRESS NOTES
Assessment and Plan:   Ms Bhavik Cespedes is a 44-year-old female with history significant for a possible peripheral spondyloarthropathy who presents to establish with [facility]  She was most recently on subcutaneous adalimumab 40 mg every 2 weeks  She is transferring care from [address] [place] [address]  She is referred today by Dr Emerald Torres for a rheumatology consult  Valerie Beard presents today to establish with [facility] for her possible diagnosis of a peripheral spondyloarthropathy that was diagnosed by Rheumatology 1 year ago  She mentions initially being on (I am assuming) oral DMARDs which were discontinued due to side effects although they were efficacious and most recently was on adalimumab that has not been refilled since November 2021  She reports while this was also efficacious for her she would experience side effects of malaise  She mentions if I agree with the diagnosis of spondyloarthropathy and recommend restarting treatment that she would be accepting to go back on the adalimumab  I will work on obtaining records from her prior rheumatologist to ensure the correct diagnosis and at that time will reach out to her to determine what the next steps in treatment should be  She will complete the steroids as prescribed by her primary care physician  Plan:  Diagnoses and all orders for this visit:    History of spondyloarthropathy  -     HLA-B27 antigen; Future  -     Sjogren's Antibodies; Future  -     XR sacroiliac joints 3+ views; Future  -     Ambulatory Referral to Rheumatology    Long-term use of immunosuppressant medication    Current use of steroid medication    Screening-pulmonary TB  -     Quantiferon TB Gold Plus; Future      I have personally reviewed pertinent films in PACS of the CT chest which did not show any osseous abnormalities  Activities as tolerated  Exercise: try to maintain a low impact exercise regimen as much as possible  Walk for 30 minutes a day for at least 3 days a week  Continue other medications as prescribed by PCP and other specialists  RTC in 6 months  HPI  Ms Orly Obrien is a 41-year-old female with history significant for a possible peripheral spondyloarthropathy who presents to establish with [facility]  She was most recently on subcutaneous Humira 40 mg every 2 weeks  She is transferring care from [address] [place] [address] in [facility]  She is referred today by Dr Caty Dominguez for a rheumatology consult  I do not have records available for review from her prior rheumatologist   Patient reports she was diagnosed with a possible spondyloarthropathy approximately 1 year ago and was following with a rheumatologist in [facility]  This was diagnosed based on episodes of joint pain and swelling  She mentions she was initially on oral medications which were effective for her but they were discontinued due to side effects such as GI upset  She is unable to recall any of the names  She was then switched to subcutaneous Humira 40 mg every 2 weeks in June 2021 and was on this up until November 2021  She has not had follow-up with her rheumatologist since then so the medication was not refilled  She reports while on the Humira it did help with her symptoms of the joint pain and swelling but she would feel unwell as a result of the injection  Since then she has been managed with short courses of steroids as needed prescribed by her primary care physician  She is currently completing a course and is on 10 mg once daily  She reports while on the steroids she is completely asymptomatic  The joints that continue to bother her the most include her hands, elbows, feet as well as muscle spasms in her left lower back  At times she will still notice swelling of her feet  She denies sausage digits and states that her entire foot will swell    She experiences diffuse morning stiffness which improves quickly with activities  She has tried all the over-the-counter pain medications without any relief  She denies fevers, unintentional weight loss, inflammatory eye disease, skin rash, psoriasis, inflammatory bowel disease or a family history of autoimmune disease  She did have recent blood work done by her primary care physician which showed an elevated ESR and high sensitivity C-reactive protein of 59 and 25 7, respectively  An STEVE screen, rheumatoid factor, anti CCP antibody and hepatitis-C antibody were normal     The following portions of the patient's history were reviewed and updated as appropriate: allergies, current medications, past family history, past medical history, past social history, past surgical history and problem list       Review of Systems  Constitutional: Negative for weight change, fevers, chills, night sweats, fatigue  ENT/Mouth: Negative for hearing changes, ear pain, nasal congestion, sinus pain, hoarseness, sore throat, rhinorrhea, swallowing difficulty  Eyes: Negative for pain, redness, discharge, vision changes  Cardiovascular: Negative for chest pain, SOB, palpitations  Respiratory: Negative for cough, sputum, wheezing, dyspnea  Gastrointestinal: Negative for nausea, vomiting, diarrhea, constipation, pain, heartburn  Genitourinary: Negative for dysuria, urinary frequency, hematuria  Musculoskeletal: As per HPI  Skin: Negative for skin rash, color changes  Neuro: Negative for weakness, numbness, tingling, loss of consciousness  Psych: Negative for anxiety, depression  Heme/Lymph: Negative for easy bruising, bleeding, lymphadenopathy  History reviewed  No pertinent past medical history        Past Surgical History:   Procedure Laterality Date    STOMACH SURGERY      TONSILLECTOMY      TUBAL LIGATION         Social History     Socioeconomic History    Marital status: /Civil Union     Spouse name: Not on file    Number of children: Not on file    Years of education: Not on file    Highest education level: Not on file   Occupational History    Not on file   Tobacco Use    Smoking status: Former Smoker    Smokeless tobacco: Current User   Vaping Use    Vaping Use: Never used   Substance and Sexual Activity    Alcohol use: Yes    Drug use: Not Currently    Sexual activity: Yes     Partners: Male   Other Topics Concern    Not on file   Social History Narrative    Not on file     Social Determinants of Health     Financial Resource Strain: Not on file   Food Insecurity: Not on file   Transportation Needs: Not on file   Physical Activity: Not on file   Stress: Not on file   Social Connections: Not on file   Intimate Partner Violence: Not on file   Housing Stability: Not on file       Family History   Problem Relation Age of Onset    Breast cancer Mother         Masectomy    Breast cancer Maternal Aunt         Ymph node removal       No Known Allergies      Current Outpatient Medications:     albuterol (2 5 mg/3 mL) 0 083 % nebulizer solution, Inhale 2 5 mg every 4 (four) hours as needed, Disp: , Rfl:     albuterol (2 5 mg/3 mL) 0 083 % nebulizer solution, Take 3 mL (2 5 mg total) by nebulization every 6 (six) hours as needed for wheezing or shortness of breath, Disp: 75 mL, Rfl: 0    albuterol (PROVENTIL HFA,VENTOLIN HFA) 90 mcg/act inhaler, INHALE 2 PUFFS EVERY 6 HOURS AS NEEDED FOR WHEEZING, Disp: 6 7 g, Rfl: 0    budesonide-formoterol (Symbicort) 160-4 5 mcg/act inhaler, Inhale 2 puffs 2 (two) times a day Symbicort Brand medically necessary, Disp: 10 2 g, Rfl: 11    Humira Pen 40 MG/0 4ML PNKT, , Disp: , Rfl:     predniSONE 10 mg tablet, 4 DAILY FOR 3 DAYS,3 DAILY FOR 3 DAYS,2 DAILY FOR 3 DAYS, ONE DAILY FOR 3 DAYS, Disp: 30 tablet, Rfl: 0      Objective:    Vitals:    03/10/22 1057   Temp: 98 4 °F (36 9 °C)   Weight: 95 7 kg (211 lb)       Physical Exam  General: Well appearing, well nourished, in no distress   Oriented x 3, normal mood and affect  Ambulating without difficulty  Skin: Good turgor, no rash, unusual bruising or prominent lesions  Hair: Normal texture and distribution  Nails: Normal color, no deformities  HEENT:  Head: Normocephalic, atraumatic  Eyes: Conjunctiva clear, sclera non-icteric, EOM intact  Extremities: No amputations or deformities, cyanosis, edema  Musculoskeletal: There is no peripheral joint soft tissue swelling or tenderness noted today  No enthesitis or dactylitis noted  Neurologic: Alert and oriented  No focal neurological deficits appreciated  Psychiatric: Normal mood and affect  ANTONIO Skaggs    Rheumatology

## 2022-03-11 ENCOUNTER — PREP FOR PROCEDURE (OUTPATIENT)
Dept: GASTROENTEROLOGY | Facility: CLINIC | Age: 47
End: 2022-03-11

## 2022-03-11 DIAGNOSIS — Z12.11 COLON CANCER SCREENING: Primary | ICD-10-CM

## 2022-03-16 ENCOUNTER — APPOINTMENT (OUTPATIENT)
Dept: LAB | Facility: CLINIC | Age: 47
End: 2022-03-16
Payer: COMMERCIAL

## 2022-03-16 DIAGNOSIS — Z87.39 HISTORY OF SPONDYLOARTHROPATHY: ICD-10-CM

## 2022-03-16 DIAGNOSIS — Z11.1 SCREENING-PULMONARY TB: ICD-10-CM

## 2022-03-16 PROCEDURE — 86235 NUCLEAR ANTIGEN ANTIBODY: CPT

## 2022-03-16 PROCEDURE — 86480 TB TEST CELL IMMUN MEASURE: CPT

## 2022-03-16 PROCEDURE — 36415 COLL VENOUS BLD VENIPUNCTURE: CPT

## 2022-03-16 PROCEDURE — 81374 HLA I TYPING 1 ANTIGEN LR: CPT

## 2022-03-17 LAB
ENA SS-A AB SER-ACNC: <0.2 AI (ref 0–0.9)
ENA SS-B AB SER-ACNC: <0.2 AI (ref 0–0.9)
GAMMA INTERFERON BACKGROUND BLD IA-ACNC: 0.02 IU/ML
M TB IFN-G BLD-IMP: NEGATIVE
M TB IFN-G CD4+ BCKGRND COR BLD-ACNC: 0 IU/ML
M TB IFN-G CD4+ BCKGRND COR BLD-ACNC: 0 IU/ML
MITOGEN IGNF BCKGRD COR BLD-ACNC: 6.64 IU/ML

## 2022-03-21 ENCOUNTER — APPOINTMENT (OUTPATIENT)
Dept: RADIOLOGY | Facility: CLINIC | Age: 47
End: 2022-03-21
Payer: COMMERCIAL

## 2022-03-21 DIAGNOSIS — Z87.39 HISTORY OF SPONDYLOARTHROPATHY: ICD-10-CM

## 2022-03-21 PROCEDURE — 72202 X-RAY EXAM SI JOINTS 3/> VWS: CPT

## 2022-03-22 LAB — HLA-B27 QL NAA+PROBE: POSITIVE

## 2022-03-25 ENCOUNTER — TELEPHONE (OUTPATIENT)
Dept: OBGYN CLINIC | Facility: CLINIC | Age: 47
End: 2022-03-25

## 2022-03-25 NOTE — TELEPHONE ENCOUNTER
----- Message from Estelita Huffman MD sent at 3/24/2022  6:06 PM EDT -----  Please let patient know the bloodwork and x-ray does look consistent with a spondyloarthropathy and I would recommend restarting the Humira  If she is OK with this please start a prior authorization for subcutaneous Humira 40 mg every two weeks, thanks

## 2022-05-02 ENCOUNTER — PROCEDURE VISIT (OUTPATIENT)
Dept: OBGYN CLINIC | Age: 47
End: 2022-05-02
Payer: COMMERCIAL

## 2022-05-02 VITALS
SYSTOLIC BLOOD PRESSURE: 120 MMHG | HEIGHT: 64 IN | WEIGHT: 221.2 LBS | BODY MASS INDEX: 37.76 KG/M2 | DIASTOLIC BLOOD PRESSURE: 84 MMHG

## 2022-05-02 DIAGNOSIS — R87.619 ATYPICAL GLANDULAR CELLS OF UNDETERMINED SIGNIFICANCE (AGUS) ON CERVICAL PAP SMEAR: ICD-10-CM

## 2022-05-02 DIAGNOSIS — R87.610 ATYPICAL SQUAMOUS CELL CHANGES OF UNDETERMINED SIGNIFICANCE (ASCUS) ON CERVICAL CYTOLOGY WITH NEGATIVE HIGH RISK HUMAN PAPILLOMA VIRUS (HPV) TEST RESULT: Primary | ICD-10-CM

## 2022-05-02 PROCEDURE — 58110 BX DONE W/COLPOSCOPY ADD-ON: CPT | Performed by: STUDENT IN AN ORGANIZED HEALTH CARE EDUCATION/TRAINING PROGRAM

## 2022-05-02 PROCEDURE — 88305 TISSUE EXAM BY PATHOLOGIST: CPT | Performed by: PATHOLOGY

## 2022-05-02 PROCEDURE — 57454 BX/CURETT OF CERVIX W/SCOPE: CPT | Performed by: STUDENT IN AN ORGANIZED HEALTH CARE EDUCATION/TRAINING PROGRAM

## 2022-05-02 NOTE — PROGRESS NOTES
Colposcopy    Date/Time: 5/2/2022 10:03 AM  Performed by: Janae Cuba MD  Authorized by: Janae Cuba MD     Consent:     Consent obtained:  Verbal    Consent given by:  Patient    Procedural risks discussed:  Bleeding, damage to other organs, failure rate, infection and repeat procedure    Patient questions answered: yes      Patient agrees, verbalizes understanding, and wants to proceed: yes      Educational handouts given: yes      Instructions and paperwork completed: yes    Universal protocol:     Patient states understanding of procedure being performed: yes      Required blood products, implants, devices, and special equipment available: yes    Pre-procedure:     Pre-procedure timeout performed: yes      Prepped with: acetic acid    Indication:     Indication:  ASC-US ([facility])  Procedure:     Procedure: Colposcopy w/ cervical biopsy and ECC      [place] speculum was placed in the vagina: yes      Under colposcopic examination the transition zone was seen in entirety: yes      Endocervix was curetted using a Kevorkian curette: yes      Cervical biopsy performed with a cervical biopsy punch: yes      Monsel's solution was applied: yes      Biopsy(s): yes      Location:  6, 9, ECC    Specimen to pathology: yes    Post-procedure:     Impression: Low grade cervical dysplasia      Patient tolerance of procedure: Tolerated well, no immediate complications  Endometrial biopsy    Date/Time: 5/2/2022 10:04 AM  Performed by: Janae Cuba MD  Authorized by: Janae Cuba MD   Universal Protocol:  Consent: Verbal consent obtained  Written consent not obtained  Risks and benefits: risks, benefits and alternatives were discussed  Consent given by: patient  Time out: Immediately prior to procedure a "time out" was called to verify the correct patient, procedure, equipment, support staff and site/side marked as required    Patient understanding: patient states understanding of the procedure being performed  Patient consent: the patient's understanding of the procedure matches consent given  Procedure consent: procedure consent matches procedure scheduled  Test results: test results available and properly labeled  Site marked: the operative site was not marked  Required items: required blood products, implants, devices, and special equipment available  Patient identity confirmed: verbally with patient      Indication:     Indications comment:  Atypical glandular cells on Pap  Procedure:     Procedure: endometrial biopsy with Pipelle      A bivalve speculum was placed in the vagina: yes      Cervix cleaned and prepped: yes      The cervix was dilated: no      Uterus sounded: yes      Uterus sound depth (cm):  9    Specimen collected: specimen collected and sent to pathology      Patient tolerated procedure well with no complications: yes    Findings:     Cervix: normal

## 2022-06-22 ENCOUNTER — OFFICE VISIT (OUTPATIENT)
Dept: INTERNAL MEDICINE CLINIC | Facility: CLINIC | Age: 47
End: 2022-06-22
Payer: COMMERCIAL

## 2022-06-22 VITALS
HEIGHT: 64 IN | RESPIRATION RATE: 17 BRPM | TEMPERATURE: 97.5 F | HEART RATE: 67 BPM | OXYGEN SATURATION: 96 % | BODY MASS INDEX: 38.07 KG/M2 | WEIGHT: 223 LBS | SYSTOLIC BLOOD PRESSURE: 118 MMHG | DIASTOLIC BLOOD PRESSURE: 80 MMHG

## 2022-06-22 DIAGNOSIS — Z11.4 SCREENING FOR HIV (HUMAN IMMUNODEFICIENCY VIRUS): ICD-10-CM

## 2022-06-22 DIAGNOSIS — J45.20 MILD INTERMITTENT ASTHMA WITHOUT COMPLICATION: Primary | ICD-10-CM

## 2022-06-22 DIAGNOSIS — Z12.31 ENCOUNTER FOR SCREENING MAMMOGRAM FOR BREAST CANCER: ICD-10-CM

## 2022-06-22 PROBLEM — J30.1 SEASONAL ALLERGIC RHINITIS DUE TO POLLEN: Status: ACTIVE | Noted: 2022-06-22

## 2022-06-22 PROCEDURE — 99214 OFFICE O/P EST MOD 30 MIN: CPT | Performed by: INTERNAL MEDICINE

## 2022-06-22 RX ORDER — AZITHROMYCIN 250 MG/1
TABLET, FILM COATED ORAL
Qty: 6 TABLET | Refills: 0 | Status: SHIPPED | OUTPATIENT
Start: 2022-06-22 | End: 2022-06-26

## 2022-06-22 RX ORDER — PREDNISONE 10 MG/1
TABLET ORAL
Qty: 30 TABLET | Refills: 0 | Status: SHIPPED | OUTPATIENT
Start: 2022-06-22

## 2022-06-22 RX ORDER — MONTELUKAST SODIUM 10 MG/1
10 TABLET ORAL
Qty: 60 TABLET | Refills: 0 | Status: SHIPPED | OUTPATIENT
Start: 2022-06-22 | End: 2022-07-14

## 2022-06-22 NOTE — PROGRESS NOTES
Assessment/Plan:       Diagnoses and all orders for this visit:    Mild intermittent asthma without complication  -     azithromycin (ZITHROMAX) 250 mg tablet; 2 RIGHT AWAY THEN 1 DAILY FOR 5 DAYS  -     montelukast (SINGULAIR) 10 mg tablet; Take 1 tablet (10 mg total) by mouth daily at bedtime  -     predniSONE 10 mg tablet; 4 DAILY FOR 3 DAYS,3 DAILY FOR 3 DAYS,2 DAILY FOR 3 DAYS, ONE DAILY FOR 3 DAYS    Screening for HIV (human immunodeficiency virus)  -     HIV 1/2 Antigen/Antibody (4th Generation) w Reflex [facility]; Future    Encounter for screening mammogram for breast cancer                Subjective:      Patient ID: Mariana Colon is a 49 y o  female  Acute visit of this now 26-year-old  She has mild intermittent asthma but has been flaring with associated rhinosinusitis symptomatology; she has some upper symptoms of nasal congestion, and a swollen will be allowed, in addition to cough and nocturnal wheezing  She has a year old but does not have Symbicort or any other type of maintenance medication     Carries a diagnosis 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  She is on no specific treatment for this right now having moved to this area and not yet picked up with rheumatologist     Employed as a    No cigarettes, no excessive alcohol, no illicit drugs  No vaping  No high risk sexual behavior  Surgical history of tonsillectomy and tubal ligation     Family history of stroke and hypertension           The following portions of the patient's history were reviewed and updated as appropriate:   She has no past medical history on file  ,  does not have any pertinent problems on file  ,   has a past surgical history that includes Tonsillectomy; Stomach surgery; and Tubal ligation  ,  family history includes Breast cancer in her maternal aunt and mother  ,   reports that she has quit smoking  She uses smokeless tobacco  She reports current alcohol use   She reports previous drug use ,  has No Known Allergies     Current Outpatient Medications   Medication Sig Dispense Refill    albuterol (2 5 mg/3 mL) 0 083 % nebulizer solution Inhale 2 5 mg every 4 (four) hours as needed      albuterol (PROVENTIL HFA,VENTOLIN HFA) 90 mcg/act inhaler INHALE 2 PUFFS EVERY 6 HOURS AS NEEDED FOR WHEEZING 6 7 g 0    azithromycin (ZITHROMAX) 250 mg tablet 2 RIGHT AWAY THEN 1 DAILY FOR 5 DAYS 6 tablet 0    budesonide-formoterol (Symbicort) 160-4 5 mcg/act inhaler Inhale 2 puffs 2 (two) times a day Symbicort Brand medically necessary 10 2 g 11    montelukast (SINGULAIR) 10 mg tablet Take 1 tablet (10 mg total) by mouth daily at bedtime 60 tablet 0    predniSONE 10 mg tablet 4 DAILY FOR 3 DAYS,3 DAILY FOR 3 DAYS,2 DAILY FOR 3 DAYS, ONE DAILY FOR 3 DAYS 30 tablet 0    albuterol (2 5 mg/3 mL) 0 083 % nebulizer solution Take 3 mL (2 5 mg total) by nebulization every 6 (six) hours as needed for wheezing or shortness of breath (Patient not taking: Reported on 6/22/2022) 75 mL 0     No current facility-administered medications for this visit  Review of Systems   HENT: Positive for congestion and sore throat  Respiratory: Positive for cough and wheezing  Musculoskeletal: Positive for arthralgias and back pain  All other systems reviewed and are negative  Objective:  Vitals:    06/22/22 0844   BP: 118/80   Pulse: 67   Resp: 17   Temp: 97 5 °F (36 4 °C)   SpO2: 96%      Physical Exam  Constitutional:       Comments: Alert overweight female patient who appears to be the stated age  She is verbalizing these respiratory complaints but is not in respiratory distress, not tachypneic, not dyspneic, and not using accessory muscles, although coughing occasionally  HENT:      Mouth/Throat:      Mouth: Mucous membranes are moist       Pharynx: Posterior oropharyngeal erythema and uvula swelling present  No oropharyngeal exudate  Comments: Ov alert erythema and slight edema    No upper airway obstruction  Cardiovascular:      Rate and Rhythm: Normal rate and regular rhythm  Pulmonary:      Effort: Pulmonary effort is normal  No accessory muscle usage, prolonged expiration, respiratory distress or retractions  Breath sounds: Examination of the right-middle field reveals wheezing  Examination of the left-middle field reveals wheezing  Wheezing and rhonchi present  No rales  Comments: Minimal wheeze and rhonchi mid lung fields bilaterally  Abdominal:      Palpations: Abdomen is soft  Neurological:      Mental Status: She is alert  Psychiatric:         Mood and Affect: Mood normal          Judgment: Judgment normal            Patient Instructions   A patient with an exacerbation asthma and associated rhinosinusitis will be treated with   Steroid burst  Zithromax  Both maintenance and rescue inhalers  Maintenance Singulair 10 mg daily  Continue for about a month with a maintenance drugs    Try to come off after her allergy season is done

## 2022-06-22 NOTE — PATIENT INSTRUCTIONS
A patient with an exacerbation asthma and associated rhinosinusitis will be treated with   Steroid burst  Zithromax  Both maintenance and rescue inhalers  Maintenance Singulair 10 mg daily  Continue for about a month with a maintenance drugs    Try to come off after her allergy season is done

## 2022-06-24 ENCOUNTER — TELEPHONE (OUTPATIENT)
Dept: GASTROENTEROLOGY | Facility: AMBULARY SURGERY CENTER | Age: 47
End: 2022-06-24

## 2022-06-24 NOTE — TELEPHONE ENCOUNTER
Patient is scheduled for colonoscopy on September 15 , 2022 at [facility]  with Dion Balderrama MD  Patient is aware of pre-procedure prep of Miralax/ Magnesium Citrate and they will be called the day prior between 2 and 6 pm for time to report for procedure  Pre-procedure prep has been given to the patient  via [address] mail on June 24 , 2022

## 2022-07-14 DIAGNOSIS — J45.20 MILD INTERMITTENT ASTHMA WITHOUT COMPLICATION: ICD-10-CM

## 2022-07-14 RX ORDER — MONTELUKAST SODIUM 10 MG/1
TABLET ORAL
Qty: 90 TABLET | Refills: 1 | Status: SHIPPED | OUTPATIENT
Start: 2022-07-14

## 2022-07-28 ENCOUNTER — OFFICE VISIT (OUTPATIENT)
Dept: INTERNAL MEDICINE CLINIC | Facility: CLINIC | Age: 47
End: 2022-07-28
Payer: COMMERCIAL

## 2022-07-28 VITALS
WEIGHT: 223.4 LBS | HEART RATE: 96 BPM | BODY MASS INDEX: 38.14 KG/M2 | TEMPERATURE: 97.5 F | HEIGHT: 64 IN | RESPIRATION RATE: 18 BRPM | SYSTOLIC BLOOD PRESSURE: 122 MMHG | DIASTOLIC BLOOD PRESSURE: 84 MMHG | OXYGEN SATURATION: 97 %

## 2022-07-28 DIAGNOSIS — H92.01 RIGHT EAR PAIN: ICD-10-CM

## 2022-07-28 DIAGNOSIS — B34.9 VIRAL ILLNESS: ICD-10-CM

## 2022-07-28 DIAGNOSIS — H92.01 RIGHT EAR PAIN: Primary | ICD-10-CM

## 2022-07-28 LAB
SARS-COV-2 AG UPPER RESP QL IA: NEGATIVE
VALID CONTROL: NORMAL

## 2022-07-28 PROCEDURE — 99214 OFFICE O/P EST MOD 30 MIN: CPT | Performed by: NURSE PRACTITIONER

## 2022-07-28 PROCEDURE — 87811 SARS-COV-2 COVID19 W/OPTIC: CPT | Performed by: NURSE PRACTITIONER

## 2022-07-28 RX ORDER — NEOMYCIN SULFATE, POLYMYXIN B SULFATE, HYDROCORTISONE 3.5; 10000; 1 MG/ML; [USP'U]/ML; MG/ML
3 SOLUTION/ DROPS AURICULAR (OTIC) EVERY 8 HOURS SCHEDULED
Qty: 10 ML | Refills: 0 | Status: SHIPPED | OUTPATIENT
Start: 2022-07-28

## 2022-07-28 NOTE — PROGRESS NOTES
[facility]    NAME: Venecia Rondon  AGE: 49 y o  SEX: female  : 1975     DATE: 2022     Assessment and Plan:     Problem List Items Addressed This Visit        Other    Right ear pain - Primary       Patient presents to the office today with right ear pain  She is also concerned regarding a sore throat and runny nose  The symptoms have been going on for 2 days  COVID swab was performed in the office and was negative  Her exam is completely negative  I do not believe the patient needs an antibiotic at this time  I did prescribed ear drops for the patient  I did recommend to her that up at her symptoms worsen or she develops new symptoms she may want to retest for COVID  Relevant Medications    neomycin-colistin-hydrocortisone-thonzonium (CORTISPORIN TC) 0 33-0 3-1-0 05 % otic suspension      Other Visit Diagnoses     Viral illness        Relevant Orders    POCT Rapid Covid Ag (Completed)              No follow-ups on file  Chief Complaint:     Chief Complaint   Patient presents with    Earache     Right ear        History of Present Illness:       Patient presents to the office today with a 2 day history of ear pain, cough, runny nose  This is discussed above  She is COVID negative  She will monitor symptoms down  An ear drop was sent to her pharmacy for comfort  Ibuprofen or Tylenol recommended     Review of Systems:     Review of Systems   Constitutional: Negative for activity change, fatigue and fever  HENT: Positive for ear pain and rhinorrhea  Negative for congestion, hearing loss, trouble swallowing and voice change  Eyes: Negative for photophobia, pain, discharge and visual disturbance  Respiratory: Positive for cough  Negative for chest tightness and shortness of breath  Cardiovascular: Negative for chest pain, palpitations and leg swelling     Gastrointestinal: Negative for abdominal pain, blood in stool, constipation, nausea and vomiting  Endocrine: Negative for cold intolerance and heat intolerance  Genitourinary: Negative for difficulty urinating, frequency, hematuria, urgency, vaginal bleeding and vaginal discharge  Musculoskeletal: Negative for arthralgias and myalgias  Skin: Negative  Neurological: Negative for dizziness, weakness, numbness and headaches  Psychiatric/Behavioral: Negative for decreased concentration  The patient is not nervous/anxious  Problem List:     Patient Active Problem List   Diagnosis    Vaginal odor    Spondyloarthropathy    Mild intermittent asthma without complication    Seasonal allergic rhinitis due to pollen    Right ear pain        Objective:     /84 (BP Location: Left arm, Patient Position: Sitting, Cuff Size: Standard)   Pulse 96   Temp 97 5 °F (36 4 °C) (Axillary) Comment: patient taook advil 250 mg today  Resp 18   Ht 5' 4" (1 626 m)   Wt 101 kg (223 lb 6 4 oz)   SpO2 97%   BMI 38 35 kg/m²     Current Outpatient Medications   Medication Instructions    albuterol (PROVENTIL HFA,VENTOLIN HFA) 90 mcg/act inhaler INHALE 2 PUFFS EVERY 6 HOURS AS NEEDED FOR WHEEZING    albuterol 2 5 mg, Inhalation, Every 4 hours PRN    budesonide-formoterol (Symbicort) 160-4 5 mcg/act inhaler 2 puffs, Inhalation, 2 times daily, Symbicort Brand medically necessary    montelukast (SINGULAIR) 10 mg tablet TAKE 1 TABLET BY MOUTH DAILY AT BEDTIME    neomycin-colistin-hydrocortisone-thonzonium (CORTISPORIN TC) 0 33-0 3-1-0 05 % otic suspension 3 drops, Right Ear, 4 times daily    predniSONE 10 mg tablet 4 DAILY FOR 3 DAYS,3 DAILY FOR 3 DAYS,2 DAILY FOR 3 DAYS, ONE DAILY FOR 3 DAYS         Physical Exam  Vitals reviewed  Constitutional:       Appearance: Normal appearance  She is obese  HENT:      Head: Normocephalic  Right Ear: Tympanic membrane, ear canal and external ear normal  There is no impacted cerumen        Left Ear: Tympanic membrane, ear canal and external ear normal  There is no impacted cerumen  Nose: Congestion and rhinorrhea present  Mouth/Throat:      Mouth: Mucous membranes are moist       Pharynx: Oropharynx is clear  Eyes:      Extraocular Movements: Extraocular movements intact  Pupils: Pupils are equal, round, and reactive to light  Cardiovascular:      Rate and Rhythm: Normal rate and regular rhythm  Pulses: Normal pulses  Heart sounds: Normal heart sounds  Pulmonary:      Effort: Pulmonary effort is normal       Breath sounds: Normal breath sounds  Musculoskeletal:         General: Normal range of motion  Skin:     General: Skin is warm and dry  Neurological:      General: No focal deficit present  Mental Status: She is alert and oriented to person, place, and time  Psychiatric:         Mood and Affect: Mood normal          Behavior: Behavior normal          Thought Content:  Thought content normal          Judgment: Judgment normal          [facility]

## 2022-07-28 NOTE — ASSESSMENT & PLAN NOTE
Patient presents to the office today with right ear pain  She is also concerned regarding a sore throat and runny nose  The symptoms have been going on for 2 days  COVID swab was performed in the office and was negative  Her exam is completely negative  I do not believe the patient needs an antibiotic at this time  I did prescribed ear drops for the patient  I did recommend to her that up at her symptoms worsen or she develops new symptoms she may want to retest for COVID

## 2022-09-02 ENCOUNTER — TELEPHONE (OUTPATIENT)
Dept: OBGYN CLINIC | Facility: HOSPITAL | Age: 47
End: 2022-09-02

## 2022-09-02 NOTE — TELEPHONE ENCOUNTER
Spoke with patient letter has been drafted and faxed to patients place of work at [phone], work WS#[id number]

## 2022-09-02 NOTE — TELEPHONE ENCOUNTER
DR [employer]  RE: Letter of Accomodation update  CB: [phone]    Patient called stating that she needs a letter of Accomodation for her work  She works from home and requires letter of accomodation done every 6 months in order to continue working from home because of her condition   Caller asked to speak to clinical team

## 2022-09-11 DIAGNOSIS — J45.20 MILD INTERMITTENT ASTHMA WITHOUT COMPLICATION: ICD-10-CM

## 2022-09-12 ENCOUNTER — TELEPHONE (OUTPATIENT)
Dept: INTERNAL MEDICINE CLINIC | Facility: CLINIC | Age: 47
End: 2022-09-12

## 2022-09-12 RX ORDER — PREDNISONE 10 MG/1
TABLET ORAL
Qty: 30 TABLET | Refills: 0 | Status: SHIPPED | OUTPATIENT
Start: 2022-09-12

## 2022-09-12 NOTE — TELEPHONE ENCOUNTER
Insurance won't fill Symbicort- high cost  Can the dr prescribe something in the symbicort family that will work for her

## 2022-10-20 ENCOUNTER — TELEPHONE (OUTPATIENT)
Dept: RHEUMATOLOGY | Facility: CLINIC | Age: 47
End: 2022-10-20

## 2022-10-20 NOTE — TELEPHONE ENCOUNTER
----- Message from Romi Font sent at 10/20/2022  9:11 AM EDT -----  Regarding: Reasonable accommodation   i know they are full my next appointment is December  I am not asking for new letter I am asking to add to the Letter so I can work from home   Instead of  Going into the office thanks for your time and attention

## 2022-12-08 NOTE — PROGRESS NOTES
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 who presents for a follow-up  She is currently not on DMARDs     - Flako Sharif presents today for a follow-up of ankylosing spondylitis for which we did try to restart her on subcutaneous adalimumab 40 mg every 2 weeks in March 2022 but we were unable to contact the patient  She is currently not on DMARD treatment  As she is hesitant to self inject I offered her the option of infusion therapy and she is agreeable to starting infliximab infusions 5 mg/kg and weeks 0, 2, 6 and then every 6 to 8 weeks for maintenance  She is aware of the side effects including but not limited to risk for infections  She is up-to-date with a baseline hepatitis and TB test       - In regards to the symptoms of chronic fatigue, malaise and widespread body pain this may be representative of fibromyalgia  She should follow-up with her primary care physician to address this  Plan:  Diagnoses and all orders for this visit:    Ankylosing spondylitis of sacral region ([facility] [address] )    HLA B27 positive    Long-term use of immunosuppressant medication    Chronic midline low back pain without sciatica  -     XR spine lumbar minimum 4 views non injury; Future  -     XR spine thoracic 3 vw; Future  -     Ambulatory Referral to Pain Management; Future      Activities as tolerated  Exercise: try to maintain a low impact exercise regimen as much as possible  Walk for 30 minutes a day for at least 3 days a week  Continue other medications as prescribed by PCP and other specialists  RTC in 5 months  HPI     INITIAL VISIT NOTE (3/2022):  Ms Jackie Joseph is a 44-year-old female with history significant for a possible peripheral spondyloarthropathy who presents to establish with [facility]  She was most recently on subcutaneous Humira 40 mg every 2 weeks    She is transferring care from [address] [place] [address] in [place]  She is referred today by Dr Marycarmen Mireles for a rheumatology consult  I do not have records available for review from her prior rheumatologist   Patient reports she was diagnosed with a possible spondyloarthropathy approximately 1 year ago and was following with a rheumatologist in [place]  This was diagnosed based on episodes of joint pain and swelling  She mentions she was initially on oral medications which were effective for her but they were discontinued due to side effects such as GI upset  She is unable to recall any of the names  She was then switched to subcutaneous Humira 40 mg every 2 weeks in June 2021 and was on this up until November 2021  She has not had follow-up with her rheumatologist since then so the medication was not refilled  She reports while on the Humira it did help with her symptoms of the joint pain and swelling but she would feel unwell as a result of the injection  Since then she has been managed with short courses of steroids as needed prescribed by her primary care physician  She is currently completing a course and is on 10 mg once daily  She reports while on the steroids she is completely asymptomatic  The joints that continue to bother her the most include her hands, elbows, feet as well as muscle spasms in her left lower back  At times she will still notice swelling of her feet  She denies sausage digits and states that her entire foot will swell  She experiences diffuse morning stiffness which improves quickly with activities  She has tried all the over-the-counter pain medications without any relief  She denies fevers, unintentional weight loss, inflammatory eye disease, skin rash, psoriasis, inflammatory bowel disease or a family history of autoimmune disease        She did have recent blood work done by her primary care physician which showed an elevated ESR and high sensitivity C-reactive protein of 59 and 25 7, respectively  An STEVE screen, rheumatoid factor, anti CCP antibody and hepatitis-C antibody were normal       12/12/2022:  Patient presents for a follow-up today  I reviewed the work-up done after the last office visit with the x-ray of the sacroiliac joints showing mildly increased bilateral sacroiliac joint space with sclerosis concerning for sacroiliitis  The blood work showed a positive HLA-B27 antigen  Sjogren's antibodies and a QuantiFERON-TB gold were normal     Patient reports she is primarily experiencing pain at this time in her neck, upper back and shoulder region along with a flulike sensation but without fevers  She is generally feeling unwell and is also describing widespread body aches with joint and muscle pain as well as fatigue  After receiving her results in March 2022 we did try to contact her to restart adalimumab but she did not get back to the office  The following portions of the patient's history were reviewed and updated as appropriate: allergies, current medications, past family history, past medical history, past social history, past surgical history and problem list       Review of Systems  Constitutional: Negative for weight change, fevers, chills, night sweats  Positive for fatigue  ENT/Mouth: Negative for hearing changes, ear pain, nasal congestion, sinus pain, hoarseness, sore throat, rhinorrhea, swallowing difficulty  Eyes: Negative for pain, redness, discharge, vision changes  Cardiovascular: Negative for chest pain, SOB, palpitations  Respiratory: Negative for cough, sputum, wheezing, dyspnea  Gastrointestinal: Negative for nausea, vomiting, diarrhea, constipation, pain, heartburn  Genitourinary: Negative for dysuria, urinary frequency, hematuria  Musculoskeletal: As per HPI  Skin: Negative for skin rash, color changes  Neuro: Negative for weakness, numbness, tingling, loss of consciousness  Psych: Negative for anxiety, depression     Heme/Lymph: Negative for easy bruising, bleeding, lymphadenopathy  History reviewed  No pertinent past medical history  Past Surgical History:   Procedure Laterality Date   • STOMACH SURGERY     • TONSILLECTOMY     • TUBAL LIGATION         Social History     Socioeconomic History   • Marital status: /Civil Union     Spouse name: Not on file   • Number of children: Not on file   • Years of education: Not on file   • Highest education level: Not on file   Occupational History   • Not on file   Tobacco Use   • Smoking status: Former   • Smokeless tobacco: Current   Vaping Use   • Vaping Use: Never used   Substance and Sexual Activity   • Alcohol use: Yes   • Drug use: Not Currently   • Sexual activity: Yes     Partners: Male   Other Topics Concern   • Not on file   Social History Narrative   • Not on file     Social Determinants of Health     Financial Resource Strain: Not on file   Food Insecurity: Not on file   Transportation Needs: Not on file   Physical Activity: Not on file   Stress: Stress Concern Present   • Feeling of Stress :  To some extent   Social Connections: Not on file   Intimate Partner Violence: Not on file   Housing Stability: Not on file       Family History   Problem Relation Age of Onset   • Breast cancer Mother         Masectomy   • Breast cancer Maternal Aunt         Ymph node removal       No Known Allergies      Current Outpatient Medications:   •  albuterol (2 5 mg/3 mL) 0 083 % nebulizer solution, Inhale 2 5 mg every 4 (four) hours as needed, Disp: , Rfl:   •  albuterol (PROVENTIL HFA,VENTOLIN HFA) 90 mcg/act inhaler, INHALE 2 PUFFS EVERY 6 HOURS AS NEEDED FOR WHEEZING, Disp: 6 7 g, Rfl: 0  •  budesonide-formoterol (Symbicort) 160-4 5 mcg/act inhaler, Inhale 2 puffs 2 (two) times a day Symbicort Brand medically necessary, Disp: 10 2 g, Rfl: 11  •  montelukast (SINGULAIR) 10 mg tablet, TAKE 1 TABLET BY MOUTH DAILY AT BEDTIME (Patient not taking: Reported on 12/12/2022), Disp: 90 tablet, Rfl: 1  •  neomycin-polymyxin-hydrocortisone (CORTISPORIN) 1 % SOLN, Administer 3 drops to the right ear every 8 (eight) hours (Patient not taking: Reported on 12/12/2022), Disp: 10 mL, Rfl: 0  •  predniSONE 10 mg tablet, TAKE 4TABS/DAY X3DAYS, 3TABS/DAY X3DAYS, 2TABS/DAY X3DAYS THEN 1TAB/DAY X3DAYS (Patient not taking: Reported on 12/12/2022), Disp: 30 tablet, Rfl: 0      Objective:    Vitals:    12/12/22 1337   BP: 127/81   Pulse: 80   Temp: 98 2 °F (36 8 °C)   Weight: 101 kg (223 lb)   Height: 5' 4" (1 626 m)       Physical Exam  General: Well appearing, well nourished, in no distress  Oriented x 3, normal mood and affect  Ambulating without difficulty  Skin: Good turgor, no rash, unusual bruising or prominent lesions  Hair: Normal texture and distribution  Nails: Normal color, no deformities  HEENT:  Head: Normocephalic, atraumatic  Eyes: Conjunctiva clear, sclera non-icteric, EOM intact  Extremities: No amputations or deformities, cyanosis, edema  Neurologic: Alert and oriented  No focal neurological deficits appreciated  Psychiatric: Normal mood and affect  Herbert Dancer, M D    Rheumatology

## 2022-12-12 ENCOUNTER — TELEPHONE (OUTPATIENT)
Dept: RHEUMATOLOGY | Facility: CLINIC | Age: 47
End: 2022-12-12

## 2022-12-12 ENCOUNTER — OFFICE VISIT (OUTPATIENT)
Dept: RHEUMATOLOGY | Facility: CLINIC | Age: 47
End: 2022-12-12

## 2022-12-12 VITALS
WEIGHT: 223 LBS | SYSTOLIC BLOOD PRESSURE: 127 MMHG | DIASTOLIC BLOOD PRESSURE: 81 MMHG | HEART RATE: 80 BPM | HEIGHT: 64 IN | BODY MASS INDEX: 38.07 KG/M2 | TEMPERATURE: 98.2 F

## 2022-12-12 DIAGNOSIS — Z15.89 HLA B27 POSITIVE: ICD-10-CM

## 2022-12-12 DIAGNOSIS — Z79.60 LONG-TERM USE OF IMMUNOSUPPRESSANT MEDICATION: ICD-10-CM

## 2022-12-12 DIAGNOSIS — G89.29 CHRONIC MIDLINE LOW BACK PAIN WITHOUT SCIATICA: ICD-10-CM

## 2022-12-12 DIAGNOSIS — M45.8 ANKYLOSING SPONDYLITIS OF SACRAL REGION (HCC): Primary | ICD-10-CM

## 2022-12-12 DIAGNOSIS — M54.50 CHRONIC MIDLINE LOW BACK PAIN WITHOUT SCIATICA: ICD-10-CM

## 2022-12-12 NOTE — TELEPHONE ENCOUNTER
Please start prior authorization for Remicade infusions 5 mg/kg at weeks 0, 2, 6 and then every 6-8 weeks for ankylosing spondylitis  She has previously been on Humira  She has a TB and hepatitis test in her chart  She would like to be set up at the [facility]

## 2022-12-27 NOTE — PROGRESS NOTES
Assessment:  1  Sacroiliitis ([address] )    2  Chronic midline low back pain without sciatica    3  Polyarthralgia    4  Chronic pain syndrome    5  Myofascial pain syndrome        Plan:  Orders Placed This Encounter   Procedures   • Durable Medical Equipment     1 TENS UNIT   • FL spine and pain procedure     Standing Status:   Future     Standing Expiration Date:   12/28/2026     Order Specific Question:   Reason for Exam:     Answer:   b/l SI joint injection     Order Specific Question:   Anticoagulant hold needed? Answer:   no     Order Specific Question:   Is the patient pregnant? Answer:   Unknown   • Ambulatory referral to Physical Therapy     Standing Status:   Future     Standing Expiration Date:   12/28/2023     Referral Priority:   Routine     Referral Type:   Physical Therapy     Referral Reason:   Specialty Services Required     Requested Specialty:   Physical Therapy     Number of Visits Requested:   1     Expiration Date:   12/28/2023       No orders of the defined types were placed in this encounter  My impressions and treatment recommendations were discussed in detail with the patient, who verbalized understanding and had no further questions  This is a 54-year-old female who presents her office chief complaint of midthoracic pain, followed by bilateral low back pain, and polyarthralgia  Her mid back pain is the worst of her symptoms  Appears to be myofascial in nature  Has tenderness palpation in the bilateral thoracic paraspinous musculature  Has x-ray of the thoracic spine pending which she will get done today  She also has notable pain over the bilateral SI joints with pain with provocative maneuvers as noted below  This is in the setting of noted sacroiliitis on imaging  It appears that she is suffering from SI joint pain as well  Discussed bilateral SI joint injection help with this issue    Also feel like she would benefit from a course of aquatic therapy for her polyarthralgia and mid back pain  Referral was given  She would also benefit from a TENS unit which was also ordered for myofascial symptoms  [place] Prescription Drug Monitoring Program report was reviewed and was appropriate     Complete risks and benefits including bleeding, infection, tissue reaction, nerve injury and allergic reaction were discussed  The approach was demonstrated using models and literature was provided  Verbal and written consent was obtained  Discharge instructions were provided  I personally saw and examined the patient and I agree with the above discussed plan of care  History of Present Illness:    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  female who presents to [facility] for initial evaluation of the above stated pain complaints  The patient has a past medical and chronic pain history as outlined in the assessment section  [facility]   [address]  [address]   Complaint of polyarthralgia and low back pain  She is a   Moderate to severe over past month  Ranges from 6-10  Nearly constant  Present in afternoon, evening, nighttime  Cramping, shooting, numbness, sharp, pins-and-needles  Reports weakness in upper and lower extremity  Of SI joint showing increased SI joint spacing, concerning for sacroiliitis  Also has degenerative lumbar disease  Has x-ray of the lumbar spine pending which has not been completed  Past surgical history includes gastric sleeve  Next  Past medical history includes anxiety, asthma, depression  No relief with exercise or heat/ice therapy  She currently is rheumatology for ankylosing spondylitis management  Smokes tobacco   No marijuana  No alcohol  Currently using acetaminophen, aspirin, ibuprofen  Review of Systems:    Review of Systems   Constitutional: Positive for chills, fever and unexpected weight change     HENT: Negative for trouble swallowing  Eyes: Negative for visual disturbance  Respiratory: Positive for cough and wheezing  Negative for shortness of breath  Cardiovascular: Negative for chest pain and palpitations  Gastrointestinal: Negative for constipation, diarrhea, nausea and vomiting  Endocrine: Positive for polyuria  Negative for cold intolerance, heat intolerance and polydipsia  Genitourinary: Negative for difficulty urinating and frequency  Musculoskeletal: Positive for arthralgias, joint swelling and myalgias  Negative for gait problem  Skin: Negative for rash  Neurological: Negative for dizziness, seizures, syncope, weakness and headaches  Hematological: Does not bruise/bleed easily  Psychiatric/Behavioral: Negative for dysphoric mood  Anxiety  Depression     All other systems reviewed and are negative  History reviewed  No pertinent past medical history  Past Surgical History:   Procedure Laterality Date   • STOMACH SURGERY     • TONSILLECTOMY     • TUBAL LIGATION         Family History   Problem Relation Age of Onset   • Breast cancer Mother         Masectomy   • Breast cancer Maternal Aunt         Ymph node removal       Social History     Occupational History   • Not on file   Tobacco Use   • Smoking status: Former   • Smokeless tobacco: Current   Vaping Use   • Vaping Use: Never used   Substance and Sexual Activity   • Alcohol use:  Yes   • Drug use: Not Currently   • Sexual activity: Yes     Partners: Male         Current Outpatient Medications:   •  albuterol (PROVENTIL HFA,VENTOLIN HFA) 90 mcg/act inhaler, INHALE 2 PUFFS EVERY 6 HOURS AS NEEDED FOR WHEEZING, Disp: 6 7 g, Rfl: 0  •  budesonide-formoterol (Symbicort) 160-4 5 mcg/act inhaler, Inhale 2 puffs 2 (two) times a day Symbicort Brand medically necessary, Disp: 10 2 g, Rfl: 11  •  montelukast (SINGULAIR) 10 mg tablet, TAKE 1 TABLET BY MOUTH DAILY AT BEDTIME (Patient not taking: Reported on 12/12/2022), Disp: 90 tablet, Rfl: 1  •  neomycin-polymyxin-hydrocortisone (CORTISPORIN) 1 % SOLN, Administer 3 drops to the right ear every 8 (eight) hours (Patient not taking: Reported on 12/12/2022), Disp: 10 mL, Rfl: 0  •  predniSONE 10 mg tablet, TAKE 4TABS/DAY X3DAYS, 3TABS/DAY X3DAYS, 2TABS/DAY X3DAYS THEN 1TAB/DAY X3DAYS (Patient not taking: Reported on 12/12/2022), Disp: 30 tablet, Rfl: 0    No Known Allergies    Physical Exam:    /78 (BP Location: Left arm, Patient Position: Sitting, Cuff Size: Standard)   Pulse 81   Ht 5' 4" (1 626 m)   Wt 100 kg (221 lb)   BMI 37 93 kg/m²     Constitutional: normal, well developed, well nourished, alert, in no distress and non-toxic and no overt pain behavior    Eyes: anicteric  HEENT: grossly intact  Neck: supple, symmetric, trachea midline and no masses   Pulmonary:even and unlabored  Cardiovascular:No edema or pitting edema present  Skin:Normal without rashes or lesions and well hydrated  Psychiatric:Mood and affect appropriate  Neurologic:Cranial Nerves II-XII grossly intact  Musculoskeletal:normal     Lumbar Spine Exam    Appearance:  Normal lordosis  Palpation/Tenderness:  left lumbar paraspinal tenderness  right lumbar paraspinal tenderness  left sacroiliac joint tenderness  right sacroiliac joint tenderness   TTP bilateral thoracic paraspinal musculature  Sensory:  no sensory deficits noted    Motor Strength:  Left hip flexion:  5/5  Left hip extension:  5/5  Right hip flexion:  5/5  Right hip extension:  5/5  Left knee flexion:  5/5  Left knee extension:  5/5  Right knee flexion:  5/5  Right knee extension:  5/5  Left foot dorsiflexion:  5/5  Left foot plantar flexion:  5/5  Right foot dorsiflexion:  5/5  Right foot plantar flexion:  5/5  Reflexes:  Left Patellar:  2+   Right Patellar:  2+   Left Achilles:  2+   Right Achilles:  2+   Special Tests:  Left Straight Leg Test:  negative  Right Straight Leg Test:  negative  Left Dayron's Maneuver:  positive  Right Dayron's Maneuver:  positive  Left Gaenslen's Test:  positive  Right Gaenslen's Test:  positive  Left Pelvic Distraction Test:  positive  Right Pelvic Distraction Test:  positive      Imaging  FL spine and pain procedure    (Results Pending)       Orders Placed This Encounter   Procedures   • Durable Medical Equipment   • FL spine and pain procedure   • Ambulatory referral to Physical Therapy

## 2022-12-27 NOTE — H&P (VIEW-ONLY)
Assessment:  1  Sacroiliitis ([address] )    2  Chronic midline low back pain without sciatica    3  Polyarthralgia    4  Chronic pain syndrome    5  Myofascial pain syndrome        Plan:  Orders Placed This Encounter   Procedures   • Durable Medical Equipment     1 TENS UNIT   • FL spine and pain procedure     Standing Status:   Future     Standing Expiration Date:   12/28/2026     Order Specific Question:   Reason for Exam:     Answer:   b/l SI joint injection     Order Specific Question:   Anticoagulant hold needed? Answer:   no     Order Specific Question:   Is the patient pregnant? Answer:   Unknown   • Ambulatory referral to Physical Therapy     Standing Status:   Future     Standing Expiration Date:   12/28/2023     Referral Priority:   Routine     Referral Type:   Physical Therapy     Referral Reason:   Specialty Services Required     Requested Specialty:   Physical Therapy     Number of Visits Requested:   1     Expiration Date:   12/28/2023       No orders of the defined types were placed in this encounter  My impressions and treatment recommendations were discussed in detail with the patient, who verbalized understanding and had no further questions  This is a 48-year-old female who presents her office chief complaint of midthoracic pain, followed by bilateral low back pain, and polyarthralgia  Her mid back pain is the worst of her symptoms  Appears to be myofascial in nature  Has tenderness palpation in the bilateral thoracic paraspinous musculature  Has x-ray of the thoracic spine pending which she will get done today  She also has notable pain over the bilateral SI joints with pain with provocative maneuvers as noted below  This is in the setting of noted sacroiliitis on imaging  It appears that she is suffering from SI joint pain as well  Discussed bilateral SI joint injection help with this issue    Also feel like she would benefit from a course of aquatic therapy for her polyarthralgia and mid back pain  Referral was given  She would also benefit from a TENS unit which was also ordered for myofascial symptoms  [place] Prescription Drug Monitoring Program report was reviewed and was appropriate     Complete risks and benefits including bleeding, infection, tissue reaction, nerve injury and allergic reaction were discussed  The approach was demonstrated using models and literature was provided  Verbal and written consent was obtained  Discharge instructions were provided  I personally saw and examined the patient and I agree with the above discussed plan of care  History of Present Illness:    Aileen Contreras is a 49 y o  female who presents to [facility] for initial evaluation of the above stated pain complaints  The patient has a past medical and chronic pain history as outlined in the assessment section  She was referred by MD Geni Stone 5  [address]  [address]   Complaint of polyarthralgia and low back pain  She is a   Moderate to severe over past month  Ranges from 6-10  Nearly constant  Present in afternoon, evening, nighttime  Cramping, shooting, numbness, sharp, pins-and-needles  Reports weakness in upper and lower extremity  Of SI joint showing increased SI joint spacing, concerning for sacroiliitis  Also has degenerative lumbar disease  Has x-ray of the lumbar spine pending which has not been completed  Past surgical history includes gastric sleeve  Next  Past medical history includes anxiety, asthma, depression  No relief with exercise or heat/ice therapy  She currently is rheumatology for ankylosing spondylitis management  Smokes tobacco   No marijuana  No alcohol  Currently using acetaminophen, aspirin, ibuprofen  Review of Systems:    Review of Systems   Constitutional: Positive for chills, fever and unexpected weight change     HENT: Negative for trouble swallowing  Eyes: Negative for visual disturbance  Respiratory: Positive for cough and wheezing  Negative for shortness of breath  Cardiovascular: Negative for chest pain and palpitations  Gastrointestinal: Negative for constipation, diarrhea, nausea and vomiting  Endocrine: Positive for polyuria  Negative for cold intolerance, heat intolerance and polydipsia  Genitourinary: Negative for difficulty urinating and frequency  Musculoskeletal: Positive for arthralgias, joint swelling and myalgias  Negative for gait problem  Skin: Negative for rash  Neurological: Negative for dizziness, seizures, syncope, weakness and headaches  Hematological: Does not bruise/bleed easily  Psychiatric/Behavioral: Negative for dysphoric mood  Anxiety  Depression     All other systems reviewed and are negative  History reviewed  No pertinent past medical history  Past Surgical History:   Procedure Laterality Date   • STOMACH SURGERY     • TONSILLECTOMY     • TUBAL LIGATION         Family History   Problem Relation Age of Onset   • Breast cancer Mother         Masectomy   • Breast cancer Maternal Aunt         Ymph node removal       Social History     Occupational History   • Not on file   Tobacco Use   • Smoking status: Former   • Smokeless tobacco: Current   Vaping Use   • Vaping Use: Never used   Substance and Sexual Activity   • Alcohol use:  Yes   • Drug use: Not Currently   • Sexual activity: Yes     Partners: Male         Current Outpatient Medications:   •  albuterol (PROVENTIL HFA,VENTOLIN HFA) 90 mcg/act inhaler, INHALE 2 PUFFS EVERY 6 HOURS AS NEEDED FOR WHEEZING, Disp: 6 7 g, Rfl: 0  •  budesonide-formoterol (Symbicort) 160-4 5 mcg/act inhaler, Inhale 2 puffs 2 (two) times a day Symbicort Brand medically necessary, Disp: 10 2 g, Rfl: 11  •  montelukast (SINGULAIR) 10 mg tablet, TAKE 1 TABLET BY MOUTH DAILY AT BEDTIME (Patient not taking: Reported on 12/12/2022), Disp: 90 tablet, Rfl: 1  •  neomycin-polymyxin-hydrocortisone (CORTISPORIN) 1 % SOLN, Administer 3 drops to the right ear every 8 (eight) hours (Patient not taking: Reported on 12/12/2022), Disp: 10 mL, Rfl: 0  •  predniSONE 10 mg tablet, TAKE 4TABS/DAY X3DAYS, 3TABS/DAY X3DAYS, 2TABS/DAY X3DAYS THEN 1TAB/DAY X3DAYS (Patient not taking: Reported on 12/12/2022), Disp: 30 tablet, Rfl: 0    No Known Allergies    Physical Exam:    /78 (BP Location: Left arm, Patient Position: Sitting, Cuff Size: Standard)   Pulse 81   Ht 5' 4" (1 626 m)   Wt 100 kg (221 lb)   BMI 37 93 kg/m²     Constitutional: normal, well developed, well nourished, alert, in no distress and non-toxic and no overt pain behavior    Eyes: anicteric  HEENT: grossly intact  Neck: supple, symmetric, trachea midline and no masses   Pulmonary:even and unlabored  Cardiovascular:No edema or pitting edema present  Skin:Normal without rashes or lesions and well hydrated  Psychiatric:Mood and affect appropriate  Neurologic:Cranial Nerves II-XII grossly intact  Musculoskeletal:normal     Lumbar Spine Exam    Appearance:  Normal lordosis  Palpation/Tenderness:  left lumbar paraspinal tenderness  right lumbar paraspinal tenderness  left sacroiliac joint tenderness  right sacroiliac joint tenderness   TTP bilateral thoracic paraspinal musculature  Sensory:  no sensory deficits noted    Motor Strength:  Left hip flexion:  5/5  Left hip extension:  5/5  Right hip flexion:  5/5  Right hip extension:  5/5  Left knee flexion:  5/5  Left knee extension:  5/5  Right knee flexion:  5/5  Right knee extension:  5/5  Left foot dorsiflexion:  5/5  Left foot plantar flexion:  5/5  Right foot dorsiflexion:  5/5  Right foot plantar flexion:  5/5  Reflexes:  Left Patellar:  2+   Right Patellar:  2+   Left Achilles:  2+   Right Achilles:  2+   Special Tests:  Left Straight Leg Test:  negative  Right Straight Leg Test:  negative  Left Dayron's Maneuver:  positive  Right Dayron's Maneuver:  positive  Left Gaenslen's Test:  positive  Right Gaenslen's Test:  positive  Left Pelvic Distraction Test:  positive  Right Pelvic Distraction Test:  positive      Imaging  FL spine and pain procedure    (Results Pending)       Orders Placed This Encounter   Procedures   • Durable Medical Equipment   • FL spine and pain procedure   • Ambulatory referral to Physical Therapy

## 2022-12-28 ENCOUNTER — APPOINTMENT (OUTPATIENT)
Dept: RADIOLOGY | Facility: CLINIC | Age: 47
End: 2022-12-28

## 2022-12-28 ENCOUNTER — DOCUMENTATION (OUTPATIENT)
Dept: PAIN MEDICINE | Facility: CLINIC | Age: 47
End: 2022-12-28

## 2022-12-28 ENCOUNTER — TELEPHONE (OUTPATIENT)
Dept: PAIN MEDICINE | Facility: CLINIC | Age: 47
End: 2022-12-28

## 2022-12-28 ENCOUNTER — CONSULT (OUTPATIENT)
Dept: PAIN MEDICINE | Facility: CLINIC | Age: 47
End: 2022-12-28

## 2022-12-28 VITALS
BODY MASS INDEX: 37.73 KG/M2 | SYSTOLIC BLOOD PRESSURE: 114 MMHG | WEIGHT: 221 LBS | HEIGHT: 64 IN | HEART RATE: 81 BPM | DIASTOLIC BLOOD PRESSURE: 78 MMHG

## 2022-12-28 DIAGNOSIS — M54.50 CHRONIC MIDLINE LOW BACK PAIN WITHOUT SCIATICA: ICD-10-CM

## 2022-12-28 DIAGNOSIS — G89.4 CHRONIC PAIN SYNDROME: ICD-10-CM

## 2022-12-28 DIAGNOSIS — G89.29 CHRONIC MIDLINE LOW BACK PAIN WITHOUT SCIATICA: ICD-10-CM

## 2022-12-28 DIAGNOSIS — M25.50 POLYARTHRALGIA: ICD-10-CM

## 2022-12-28 DIAGNOSIS — M79.18 MYOFASCIAL PAIN SYNDROME: ICD-10-CM

## 2022-12-28 DIAGNOSIS — M46.1 SACROILIITIS (HCC): Primary | ICD-10-CM

## 2022-12-28 NOTE — PATIENT INSTRUCTIONS
Sacroiliitis   WHAT YOU NEED TO KNOW:   Sacroiliitis is a painful swelling of one or both of your sacroiliac joints that lasts at least 3 months  The sacroiliac joint connects your pelvis to the base of your spine  DISCHARGE INSTRUCTIONS:   Medicines:  Ask for more information about these and other medicines you may need to treat sacroiliitis:  Pain medicine: You may be given medicine to take away or decrease pain  Do not wait until the pain is severe before you take your medicine  You may be given the medicine as a pill to swallow or as a lotion that you put on the painful area  NSAIDs  help decrease swelling and pain or fever  This medicine is available with or without a doctor's order  NSAIDs can cause stomach bleeding or kidney problems in certain people  If you take blood thinner medicine, always ask your healthcare provider if NSAIDs are safe for you  Always read the medicine label and follow directions  Muscle relaxers  help decrease pain and muscle spasms  Take your medicine as directed  Contact your healthcare provider if you think your medicine is not helping or if you have side effects  Tell him of her if you are allergic to any medicine  Keep a list of the medicines, vitamins, and herbs you take  Include the amounts, and when and why you take them  Bring the list or the pill bottles to follow-up visits  Carry your medicine list with you in case of an emergency  Physical therapy:  Your healthcare provider may suggest physical therapy  Your physical therapist may teach you exercises to improve posture (the way you stand and sit), flexibility, and strength in your lower back  He may also teach you how to remain safely active and avoid further injury  Follow the exercise plan given to you by your physical therapist   Rest:  Follow your healthcare provider's instructions about how much rest you should get  Avoid activity that worsens your pain    Ice or heat packs:  Use ice or heat packs on the sore area of your body to decrease the pain and swelling  Put ice in a plastic bag covered with a towel on your low back  Cover heated items with a towel to avoid burns  Use ice and heat as directed  Follow up with your healthcare provider or spine specialist within 1 to 2 weeks:  Write down your questions so you remember to ask them during your visits  Contact your healthcare provider or spine specialist if:   Your pain makes it hard for you to do your daily activities, such as work or school  Your pain does not go away after treatment  You feel depressed or anxious  You have questions about your condition or care  Return to the emergency department if:   You have a fever  Your pain is worse than before  Your pain prevents you from sleeping  © Copyright [employer] 2022 Information is for End User's use only and may not be sold, redistributed or otherwise used for commercial purposes  All illustrations and images included in CareNotes® are the copyrighted property of [employer]  or Kesha Ravi   The above information is an  only  It is not intended as medical advice for individual conditions or treatments  Talk to your doctor, nurse or pharmacist before following any medical regimen to see if it is safe and effective for you

## 2023-01-13 ENCOUNTER — TELEPHONE (OUTPATIENT)
Dept: RADIOLOGY | Facility: CLINIC | Age: 48
End: 2023-01-13

## 2023-01-13 NOTE — TELEPHONE ENCOUNTER
S/w pt and scheduled Bilateral SI Joint injection for 1/26/23 1015 arrival  Gave pre procedure instructions and /vaccine policy  Pt is having infliximab infusion on 1/20/23, any issues with having SIJ injection on 1/26/23?

## 2023-01-16 ENCOUNTER — HOSPITAL ENCOUNTER (OUTPATIENT)
Dept: INFUSION CENTER | Facility: CLINIC | Age: 48
End: 2023-01-16

## 2023-01-19 DIAGNOSIS — M47.819 SPONDYLOARTHROPATHY: Primary | ICD-10-CM

## 2023-01-19 RX ORDER — SODIUM CHLORIDE 9 MG/ML
20 INJECTION, SOLUTION INTRAVENOUS ONCE
Status: CANCELLED | OUTPATIENT
Start: 2023-01-20

## 2023-01-19 RX ORDER — METHYLPREDNISOLONE SODIUM SUCCINATE 40 MG/ML
40 INJECTION, POWDER, LYOPHILIZED, FOR SOLUTION INTRAMUSCULAR; INTRAVENOUS ONCE
Status: CANCELLED | OUTPATIENT
Start: 2023-01-20

## 2023-01-19 RX ORDER — DIPHENHYDRAMINE HCL 25 MG
25 TABLET ORAL ONCE
Status: CANCELLED | OUTPATIENT
Start: 2023-01-20

## 2023-01-19 RX ORDER — ACETAMINOPHEN 325 MG/1
650 TABLET ORAL ONCE
Status: CANCELLED | OUTPATIENT
Start: 2023-01-20

## 2023-01-20 ENCOUNTER — HOSPITAL ENCOUNTER (OUTPATIENT)
Dept: INFUSION CENTER | Facility: CLINIC | Age: 48
Discharge: HOME | End: 2023-01-20

## 2023-01-20 VITALS
WEIGHT: 218 LBS | DIASTOLIC BLOOD PRESSURE: 68 MMHG | SYSTOLIC BLOOD PRESSURE: 111 MMHG | TEMPERATURE: 97.6 F | RESPIRATION RATE: 17 BRPM | HEART RATE: 85 BPM | BODY MASS INDEX: 37.42 KG/M2

## 2023-01-20 DIAGNOSIS — M47.819 SPONDYLOARTHROPATHY: Primary | ICD-10-CM

## 2023-01-20 RX ORDER — SODIUM CHLORIDE 9 MG/ML
20 INJECTION, SOLUTION INTRAVENOUS ONCE
Status: COMPLETED | OUTPATIENT
Start: 2023-01-20 | End: 2023-01-20

## 2023-01-20 RX ORDER — METHYLPREDNISOLONE SODIUM SUCCINATE 40 MG/ML
40 INJECTION, POWDER, LYOPHILIZED, FOR SOLUTION INTRAMUSCULAR; INTRAVENOUS ONCE
Status: CANCELLED | OUTPATIENT
Start: 2023-01-24

## 2023-01-20 RX ORDER — METHYLPREDNISOLONE SODIUM SUCCINATE 40 MG/ML
40 INJECTION, POWDER, LYOPHILIZED, FOR SOLUTION INTRAMUSCULAR; INTRAVENOUS ONCE
Status: COMPLETED | OUTPATIENT
Start: 2023-01-20 | End: 2023-01-20

## 2023-01-20 RX ORDER — CETIRIZINE HYDROCHLORIDE 10 MG/1
10 TABLET ORAL DAILY
COMMUNITY

## 2023-01-20 RX ORDER — DIPHENHYDRAMINE HCL 25 MG
25 TABLET ORAL ONCE
Status: CANCELLED | OUTPATIENT
Start: 2023-01-24

## 2023-01-20 RX ORDER — SODIUM CHLORIDE 9 MG/ML
20 INJECTION, SOLUTION INTRAVENOUS ONCE
Status: CANCELLED | OUTPATIENT
Start: 2023-01-24

## 2023-01-20 RX ORDER — ACETAMINOPHEN 325 MG/1
650 TABLET ORAL ONCE
Status: CANCELLED | OUTPATIENT
Start: 2023-01-24

## 2023-01-20 RX ORDER — ACETAMINOPHEN 325 MG/1
650 TABLET ORAL ONCE
Status: COMPLETED | OUTPATIENT
Start: 2023-01-20 | End: 2023-01-20

## 2023-01-20 RX ORDER — DIPHENHYDRAMINE HCL 25 MG
25 TABLET ORAL ONCE
Status: COMPLETED | OUTPATIENT
Start: 2023-01-20 | End: 2023-01-20

## 2023-01-20 RX ADMIN — METHYLPREDNISOLONE SODIUM SUCCINATE 40 MG: 40 INJECTION, POWDER, FOR SOLUTION INTRAMUSCULAR; INTRAVENOUS at 10:59

## 2023-01-20 RX ADMIN — DIPHENHYDRAMINE HYDROCHLORIDE 25 MG: 25 TABLET ORAL at 10:59

## 2023-01-20 RX ADMIN — INFLIXIMAB 500 MG: 100 INJECTION, POWDER, LYOPHILIZED, FOR SOLUTION INTRAVENOUS at 11:36

## 2023-01-20 RX ADMIN — ACETAMINOPHEN 650 MG: 325 TABLET ORAL at 10:59

## 2023-01-20 RX ADMIN — SODIUM CHLORIDE 20 ML/HR: 0.9 INJECTION, SOLUTION INTRAVENOUS at 11:09

## 2023-01-20 NOTE — PROGRESS NOTES
Pt to clinic for remicade  Pt offers no complaints today, denies any abx use or s s of infection  Pt educated on medication and hypersensitivity  reactions to pay close attention to and when to seek  Medical attention post infusion  Tolerated infusion without complications  Pt aware of next appointment  PIV removed  AVS was received by pt  Pt ambulated out of clinic safely

## 2023-01-23 ENCOUNTER — TELEPHONE (OUTPATIENT)
Dept: OBGYN CLINIC | Facility: HOSPITAL | Age: 48
End: 2023-01-23

## 2023-01-23 NOTE — TELEPHONE ENCOUNTER
Caller: Patient     Doctor: Dr Brianna Nieves    Reason for call: Patient had her first infusion on 1/20/23 and she was there for 4 hours  She took the day off because she wasn't sure how she would feel  Patient is asking for a letter excusing her from work on Friday secondary to Infusion  She is asking if it can be placed in her MyChart       Call back#: [phone]

## 2023-01-26 ENCOUNTER — HOSPITAL ENCOUNTER (OUTPATIENT)
Dept: RADIOLOGY | Facility: CLINIC | Age: 48
Discharge: HOME | End: 2023-01-26
Admitting: STUDENT IN AN ORGANIZED HEALTH CARE EDUCATION/TRAINING PROGRAM

## 2023-01-26 ENCOUNTER — TELEPHONE (OUTPATIENT)
Dept: PAIN MEDICINE | Facility: MEDICAL CENTER | Age: 48
End: 2023-01-26

## 2023-01-26 VITALS
RESPIRATION RATE: 20 BRPM | HEART RATE: 74 BPM | DIASTOLIC BLOOD PRESSURE: 83 MMHG | SYSTOLIC BLOOD PRESSURE: 131 MMHG | TEMPERATURE: 97.4 F | OXYGEN SATURATION: 98 %

## 2023-01-26 DIAGNOSIS — M47.819 SPONDYLOARTHROPATHY: Primary | ICD-10-CM

## 2023-01-26 DIAGNOSIS — M46.1 SACROILIITIS (HCC): ICD-10-CM

## 2023-01-26 RX ORDER — BUPIVACAINE HCL/PF 2.5 MG/ML
3 VIAL (ML) INJECTION ONCE
Status: COMPLETED | OUTPATIENT
Start: 2023-01-26 | End: 2023-01-26

## 2023-01-26 RX ORDER — METHYLPREDNISOLONE SODIUM SUCCINATE 40 MG/ML
40 INJECTION, POWDER, LYOPHILIZED, FOR SOLUTION INTRAMUSCULAR; INTRAVENOUS ONCE
Status: CANCELLED | OUTPATIENT
Start: 2023-01-30

## 2023-01-26 RX ORDER — ACETAMINOPHEN 325 MG/1
650 TABLET ORAL ONCE
Status: CANCELLED | OUTPATIENT
Start: 2023-01-30

## 2023-01-26 RX ORDER — SODIUM CHLORIDE 9 MG/ML
20 INJECTION, SOLUTION INTRAVENOUS ONCE
Status: CANCELLED | OUTPATIENT
Start: 2023-01-30

## 2023-01-26 RX ORDER — METHYLPREDNISOLONE ACETATE 80 MG/ML
80 INJECTION, SUSPENSION INTRA-ARTICULAR; INTRALESIONAL; INTRAMUSCULAR; PARENTERAL; SOFT TISSUE ONCE
Status: COMPLETED | OUTPATIENT
Start: 2023-01-26 | End: 2023-01-26

## 2023-01-26 RX ORDER — DIPHENHYDRAMINE HCL 25 MG
25 TABLET ORAL ONCE
Status: CANCELLED | OUTPATIENT
Start: 2023-01-30

## 2023-01-26 RX ADMIN — METHYLPREDNISOLONE ACETATE 80 MG: 80 INJECTION, SUSPENSION INTRA-ARTICULAR; INTRALESIONAL; INTRAMUSCULAR; PARENTERAL; SOFT TISSUE at 10:47

## 2023-01-26 RX ADMIN — Medication 3 ML: at 10:47

## 2023-01-26 RX ADMIN — IOHEXOL 1 ML: 300 INJECTION, SOLUTION INTRAVENOUS at 10:46

## 2023-01-26 NOTE — INTERVAL H&P NOTE
Update: (This section must be completed if the H&P was completed greater than 24 hrs to procedure or admission)    H&P reviewed  After examining the patient, I find no changed to the H&P since it had been written  Patient re-evaluated   Accept as history and physical     Brigida Vargas MD/January 26, 2023/10:21 AM

## 2023-01-26 NOTE — INTERVAL H&P NOTE
Update: (This section must be completed if the H&P was completed greater than 24 hrs to procedure or admission)    H&P reviewed  After examining the patient, I find no changed to the H&P since it had been written  Patient re-evaluated   Accept as history and physical     Jl Resendiz MD/January 26, 2023/10:35 AM

## 2023-01-26 NOTE — TELEPHONE ENCOUNTER
Caller: Marcella Avitia    Doctor/Office: Dr Seay     #: [phone]    Work note: Patient would like work note stating she was at the office today.    Return to work date: 01/27/2023    Fax #:     Is there any restrictions that need to be updated? If so, what? No

## 2023-01-26 NOTE — DISCHARGE INSTR - LAB
Steroid Joint Injection   WHAT YOU NEED TO KNOW:   A steroid joint injection is a procedure to inject steroid medicine into a joint  Steroid medicine decreases pain and inflammation  The injection may also contain an anesthetic (numbing medicine) to decrease pain  It may be done to treat conditions such as arthritis, gout, or carpal tunnel syndrome  The injections may be given in your knee, ankle, shoulder, elbow, wrist, ankle or sacroiliac joint  Do not apply heat to any area that is numb  If you have discomfort or soreness at the injection site, you may apply ice today, 20 minutes on and 20 minutes off  Tomorrow you may use ice or warm, moist heat  Do not apply ice or heat directly to the skin  You may have an increase or change in the discomfort for 36-48 hours after your treatment  Apply ice and continue with any pain medicine you have been prescribed  Do not do anything strenuous today  You may shower, but no tub baths or hot tubs today  You may resume your normal activities tomorrow, but do not “overdo it”  Resume normal activities slowly when you are feeling better  If you experience redness, drainage or swelling at the injection site, or if you develop a fever above 100 degrees, please call [facility] at [phone] or go to the Emergency Room  Continue to take all routine medicines prescribed by your primary care physician unless otherwise instructed by our staff  Most blood thinners should be started again according to your regularly scheduled dosing  If you have any questions, please give our office a call  As no general anesthesia was used in today's procedure, you should not experience any side effects related to anesthesia  If you are diabetic, the steroids used in today's injection may temporarily increase your blood sugar levels after the first few days after your injection   Please keep a close eye on your sugars and alert the doctor who manages your diabetes if your sugars are significantly high from your baseline or you are symptomatic  If you have a problem specifically related to your procedure, please call our office at [phone]  Problems not related to your procedure should be directed to your primary care physician

## 2023-01-30 ENCOUNTER — HOSPITAL ENCOUNTER (OUTPATIENT)
Dept: INFUSION CENTER | Facility: CLINIC | Age: 48
Discharge: HOME | End: 2023-01-30

## 2023-01-30 VITALS
TEMPERATURE: 98.6 F | HEART RATE: 81 BPM | RESPIRATION RATE: 17 BRPM | DIASTOLIC BLOOD PRESSURE: 79 MMHG | SYSTOLIC BLOOD PRESSURE: 148 MMHG

## 2023-01-30 DIAGNOSIS — M47.819 SPONDYLOARTHROPATHY: Primary | ICD-10-CM

## 2023-01-30 RX ORDER — SODIUM CHLORIDE 9 MG/ML
20 INJECTION, SOLUTION INTRAVENOUS ONCE
OUTPATIENT
Start: 2023-02-13

## 2023-01-30 RX ORDER — ACETAMINOPHEN 325 MG/1
650 TABLET ORAL ONCE
Status: COMPLETED | OUTPATIENT
Start: 2023-01-30 | End: 2023-01-30

## 2023-01-30 RX ORDER — ACETAMINOPHEN 325 MG/1
650 TABLET ORAL ONCE
OUTPATIENT
Start: 2023-02-13

## 2023-01-30 RX ORDER — METHYLPREDNISOLONE SODIUM SUCCINATE 40 MG/ML
40 INJECTION, POWDER, LYOPHILIZED, FOR SOLUTION INTRAMUSCULAR; INTRAVENOUS ONCE
OUTPATIENT
Start: 2023-02-13

## 2023-01-30 RX ORDER — DIPHENHYDRAMINE HCL 25 MG
25 TABLET ORAL ONCE
Status: COMPLETED | OUTPATIENT
Start: 2023-01-30 | End: 2023-01-30

## 2023-01-30 RX ORDER — METHYLPREDNISOLONE SODIUM SUCCINATE 40 MG/ML
40 INJECTION, POWDER, LYOPHILIZED, FOR SOLUTION INTRAMUSCULAR; INTRAVENOUS ONCE
Status: COMPLETED | OUTPATIENT
Start: 2023-01-30 | End: 2023-01-30

## 2023-01-30 RX ORDER — DIPHENHYDRAMINE HCL 25 MG
25 TABLET ORAL ONCE
OUTPATIENT
Start: 2023-02-13

## 2023-01-30 RX ORDER — SODIUM CHLORIDE 9 MG/ML
20 INJECTION, SOLUTION INTRAVENOUS ONCE
Status: COMPLETED | OUTPATIENT
Start: 2023-01-30 | End: 2023-01-30

## 2023-01-30 RX ADMIN — SODIUM CHLORIDE 20 ML/HR: 0.9 INJECTION, SOLUTION INTRAVENOUS at 14:23

## 2023-01-30 RX ADMIN — ACETAMINOPHEN 650 MG: 325 TABLET, FILM COATED ORAL at 14:24

## 2023-01-30 RX ADMIN — INFLIXIMAB 495 MG: 100 INJECTION, POWDER, LYOPHILIZED, FOR SOLUTION INTRAVENOUS at 15:06

## 2023-01-30 RX ADMIN — DIPHENHYDRAMINE HYDROCHLORIDE 25 MG: 25 TABLET ORAL at 14:24

## 2023-01-30 RX ADMIN — METHYLPREDNISOLONE SODIUM SUCCINATE 40 MG: 40 INJECTION, POWDER, FOR SOLUTION INTRAMUSCULAR; INTRAVENOUS at 14:23

## 2023-01-30 NOTE — PROGRESS NOTES
Pt reports to unit feeling well, denies any s/s of infection or antibiotic use, receiving Remicade, tolerated infusion well without any adverse events  Report given to CN RN for remainder of infusion

## 2023-02-13 DIAGNOSIS — M47.819 SPONDYLOARTHROPATHY: Primary | ICD-10-CM

## 2023-02-13 RX ORDER — ACETAMINOPHEN 325 MG/1
650 TABLET ORAL ONCE
OUTPATIENT
Start: 2023-02-27

## 2023-02-13 RX ORDER — METHYLPREDNISOLONE SODIUM SUCCINATE 40 MG/ML
40 INJECTION, POWDER, LYOPHILIZED, FOR SOLUTION INTRAMUSCULAR; INTRAVENOUS ONCE
OUTPATIENT
Start: 2023-02-27

## 2023-02-13 RX ORDER — SODIUM CHLORIDE 9 MG/ML
20 INJECTION, SOLUTION INTRAVENOUS ONCE
OUTPATIENT
Start: 2023-02-27

## 2023-02-13 RX ORDER — DIPHENHYDRAMINE HCL 25 MG
25 TABLET ORAL ONCE
OUTPATIENT
Start: 2023-02-27

## 2023-02-14 ENCOUNTER — HOSPITAL ENCOUNTER (OUTPATIENT)
Dept: INFUSION CENTER | Facility: CLINIC | Age: 48
Discharge: HOME | End: 2023-02-14

## 2023-02-22 DIAGNOSIS — M62.838 MUSCLE SPASM: Primary | ICD-10-CM

## 2023-02-22 RX ORDER — CYCLOBENZAPRINE HCL 10 MG
10 TABLET ORAL 2 TIMES DAILY PRN
Qty: 20 TABLET | Refills: 0 | Status: SHIPPED | OUTPATIENT
Start: 2023-02-22 | End: 2023-03-17

## 2023-02-28 ENCOUNTER — HOSPITAL ENCOUNTER (OUTPATIENT)
Dept: INFUSION CENTER | Facility: CLINIC | Age: 48
End: 2023-02-28

## 2023-02-28 DIAGNOSIS — M47.819 SPONDYLOARTHROPATHY: Primary | ICD-10-CM

## 2023-02-28 RX ORDER — ACETAMINOPHEN 325 MG/1
650 TABLET ORAL ONCE
Status: CANCELLED | OUTPATIENT
Start: 2023-03-02

## 2023-02-28 RX ORDER — METHYLPREDNISOLONE SODIUM SUCCINATE 40 MG/ML
40 INJECTION, POWDER, LYOPHILIZED, FOR SOLUTION INTRAMUSCULAR; INTRAVENOUS ONCE
Status: CANCELLED | OUTPATIENT
Start: 2023-03-02

## 2023-02-28 RX ORDER — DIPHENHYDRAMINE HCL 25 MG
25 TABLET ORAL ONCE
Status: CANCELLED | OUTPATIENT
Start: 2023-03-02

## 2023-02-28 RX ORDER — SODIUM CHLORIDE 9 MG/ML
20 INJECTION, SOLUTION INTRAVENOUS ONCE
Status: CANCELLED | OUTPATIENT
Start: 2023-03-02

## 2023-03-02 ENCOUNTER — HOSPITAL ENCOUNTER (OUTPATIENT)
Dept: INFUSION CENTER | Facility: CLINIC | Age: 48
Discharge: HOME | End: 2023-03-02

## 2023-03-02 VITALS
BODY MASS INDEX: 37.9 KG/M2 | RESPIRATION RATE: 18 BRPM | HEART RATE: 88 BPM | WEIGHT: 220.8 LBS | DIASTOLIC BLOOD PRESSURE: 76 MMHG | SYSTOLIC BLOOD PRESSURE: 127 MMHG

## 2023-03-02 DIAGNOSIS — M47.819 SPONDYLOARTHROPATHY: Primary | ICD-10-CM

## 2023-03-02 RX ORDER — DIPHENHYDRAMINE HCL 25 MG
25 TABLET ORAL ONCE
OUTPATIENT
Start: 2023-04-27

## 2023-03-02 RX ORDER — METHYLPREDNISOLONE SODIUM SUCCINATE 40 MG/ML
40 INJECTION, POWDER, LYOPHILIZED, FOR SOLUTION INTRAMUSCULAR; INTRAVENOUS ONCE
Status: CANCELLED | OUTPATIENT
Start: 2023-03-03

## 2023-03-02 RX ORDER — SODIUM CHLORIDE 9 MG/ML
20 INJECTION, SOLUTION INTRAVENOUS ONCE
OUTPATIENT
Start: 2023-04-27

## 2023-03-02 RX ORDER — DIPHENHYDRAMINE HCL 25 MG
25 TABLET ORAL ONCE
Status: CANCELLED | OUTPATIENT
Start: 2023-03-03

## 2023-03-02 RX ORDER — SODIUM CHLORIDE 9 MG/ML
20 INJECTION, SOLUTION INTRAVENOUS ONCE
Status: CANCELLED | OUTPATIENT
Start: 2023-03-03

## 2023-03-02 RX ORDER — SODIUM CHLORIDE 9 MG/ML
20 INJECTION, SOLUTION INTRAVENOUS ONCE
Status: COMPLETED | OUTPATIENT
Start: 2023-03-02 | End: 2023-03-02

## 2023-03-02 RX ORDER — ACETAMINOPHEN 325 MG/1
650 TABLET ORAL ONCE
Status: CANCELLED | OUTPATIENT
Start: 2023-03-03

## 2023-03-02 RX ORDER — METHYLPREDNISOLONE SODIUM SUCCINATE 40 MG/ML
40 INJECTION, POWDER, LYOPHILIZED, FOR SOLUTION INTRAMUSCULAR; INTRAVENOUS ONCE
OUTPATIENT
Start: 2023-04-27

## 2023-03-02 RX ORDER — ACETAMINOPHEN 325 MG/1
650 TABLET ORAL ONCE
OUTPATIENT
Start: 2023-04-27

## 2023-03-02 RX ORDER — ACETAMINOPHEN 325 MG/1
650 TABLET ORAL ONCE
Status: COMPLETED | OUTPATIENT
Start: 2023-03-02 | End: 2023-03-02

## 2023-03-02 RX ORDER — DIPHENHYDRAMINE HCL 25 MG
25 TABLET ORAL ONCE
Status: COMPLETED | OUTPATIENT
Start: 2023-03-02 | End: 2023-03-02

## 2023-03-02 RX ORDER — METHYLPREDNISOLONE SODIUM SUCCINATE 40 MG/ML
40 INJECTION, POWDER, LYOPHILIZED, FOR SOLUTION INTRAMUSCULAR; INTRAVENOUS ONCE
Status: COMPLETED | OUTPATIENT
Start: 2023-03-02 | End: 2023-03-02

## 2023-03-02 RX ADMIN — ACETAMINOPHEN 650 MG: 325 TABLET, FILM COATED ORAL at 11:15

## 2023-03-02 RX ADMIN — METHYLPREDNISOLONE SODIUM SUCCINATE 40 MG: 40 INJECTION, POWDER, FOR SOLUTION INTRAMUSCULAR; INTRAVENOUS at 11:18

## 2023-03-02 RX ADMIN — DIPHENHYDRAMINE HYDROCHLORIDE 25 MG: 25 TABLET ORAL at 11:15

## 2023-03-02 RX ADMIN — INFLIXIMAB 500 MG: 100 INJECTION, POWDER, LYOPHILIZED, FOR SOLUTION INTRAVENOUS at 12:04

## 2023-03-02 RX ADMIN — SODIUM CHLORIDE 20 ML/HR: 0.9 INJECTION, SOLUTION INTRAVENOUS at 11:20

## 2023-03-02 NOTE — PROGRESS NOTES
Pt reports to unit feeling well, receiving Remicade, tolerated infusion well without any adverse events  Patient aware of next appointment, PIV removed, AVS declined

## 2023-03-03 ENCOUNTER — HOSPITAL ENCOUNTER (OUTPATIENT)
Dept: INFUSION CENTER | Facility: CLINIC | Age: 48
Discharge: HOME | End: 2023-03-03

## 2023-03-17 ENCOUNTER — OFFICE VISIT (OUTPATIENT)
Dept: INTERNAL MEDICINE CLINIC | Facility: CLINIC | Age: 48
End: 2023-03-17

## 2023-03-17 VITALS
WEIGHT: 221 LBS | DIASTOLIC BLOOD PRESSURE: 76 MMHG | BODY MASS INDEX: 36.82 KG/M2 | HEART RATE: 92 BPM | OXYGEN SATURATION: 100 % | TEMPERATURE: 98.7 F | HEIGHT: 65 IN | SYSTOLIC BLOOD PRESSURE: 140 MMHG | RESPIRATION RATE: 18 BRPM

## 2023-03-17 DIAGNOSIS — M62.838 MUSCLE SPASM: ICD-10-CM

## 2023-03-17 DIAGNOSIS — J30.1 SEASONAL ALLERGIC RHINITIS DUE TO POLLEN: ICD-10-CM

## 2023-03-17 DIAGNOSIS — Z12.12 SCREENING FOR COLORECTAL CANCER: ICD-10-CM

## 2023-03-17 DIAGNOSIS — J45.20 MILD INTERMITTENT ASTHMA WITHOUT COMPLICATION: ICD-10-CM

## 2023-03-17 DIAGNOSIS — Z12.31 ENCOUNTER FOR SCREENING MAMMOGRAM FOR BREAST CANCER: ICD-10-CM

## 2023-03-17 DIAGNOSIS — Z12.11 SCREENING FOR COLORECTAL CANCER: ICD-10-CM

## 2023-03-17 RX ORDER — PREDNISONE 10 MG/1
TABLET ORAL
Qty: 30 TABLET | Refills: 0 | Status: SHIPPED | OUTPATIENT
Start: 2023-03-17

## 2023-03-17 RX ORDER — AZITHROMYCIN 250 MG/1
TABLET, FILM COATED ORAL
Qty: 6 TABLET | Refills: 0 | Status: SHIPPED | OUTPATIENT
Start: 2023-03-17 | End: 2023-03-21

## 2023-03-17 RX ORDER — CYCLOBENZAPRINE HCL 10 MG
10 TABLET ORAL 2 TIMES DAILY PRN
Qty: 20 TABLET | Refills: 0 | Status: SHIPPED | OUTPATIENT
Start: 2023-03-17

## 2023-03-17 NOTE — PROGRESS NOTES
Assessment/Plan:       Diagnoses and all orders for this visit:    Mild intermittent asthma without complication  -     azithromycin (ZITHROMAX) 250 mg tablet; 2 RIGHT AWAY THEN 1 DAILY FOR 5 DAYS  -     predniSONE 10 mg tablet; TAKE 4TABS/DAY X3DAYS, 3TABS/DAY X3DAYS, 2TABS/DAY X3DAYS THEN 1TAB/DAY X3DAYS    Seasonal allergic rhinitis due to pollen    Encounter for screening mammogram for breast cancer  -     Mammo screening bilateral w 3d & cad; Future    Screening for colorectal cancer  -     Ambulatory referral for Colonoscopy; Future                Subjective:      Patient ID: Kan Sofia is a 51 y o  female  Acute visit of this now 48-year-old  She has mild intermittent asthma but has been flaring with associated rhinosinusitis symptomatology; she has some upper symptoms of nasal congestion, and a swollen will be allowed, in addition to cough and nocturnal wheezing  Carries a diagnosis 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  Currently on remicade      She is on no specific treatment for this right now having moved to this area and not yet picked up with rheumatologist     Employed as a    No cigarettes, no excessive alcohol, no illicit drugs  No vaping  No high risk sexual behavior  Surgical history of tonsillectomy and tubal ligation     Family history of stroke and hypertension           The following portions of the patient's history were reviewed and updated as appropriate:   She has no past medical history on file  ,  does not have any pertinent problems on file  ,   has a past surgical history that includes Tonsillectomy; Stomach surgery; and Tubal ligation  ,  family history includes Breast cancer in her maternal aunt and mother  ,   reports that she has quit smoking  She uses smokeless tobacco  She reports current alcohol use  She reports that she does not currently use drugs  ,  has No Known Allergies     Current Outpatient Medications   Medication Sig Dispense Refill   • albuterol (PROVENTIL HFA,VENTOLIN HFA) 90 mcg/act inhaler INHALE 2 PUFFS EVERY 6 HOURS AS NEEDED FOR WHEEZING 6 7 g 0   • azithromycin (ZITHROMAX) 250 mg tablet 2 RIGHT AWAY THEN 1 DAILY FOR 5 DAYS 6 tablet 0   • budesonide-formoterol (Symbicort) 160-4 5 mcg/act inhaler Inhale 2 puffs 2 (two) times a day Symbicort Brand medically necessary 10 2 g 11   • cetirizine (ZyrTEC) 10 mg tablet Take 10 mg by mouth daily     • montelukast (SINGULAIR) 10 mg tablet TAKE 1 TABLET BY MOUTH DAILY AT BEDTIME 90 tablet 1   • predniSONE 10 mg tablet TAKE 4TABS/DAY X3DAYS, 3TABS/DAY X3DAYS, 2TABS/DAY X3DAYS THEN 1TAB/DAY X3DAYS 30 tablet 0   • neomycin-polymyxin-hydrocortisone (CORTISPORIN) 1 % SOLN Administer 3 drops to the right ear every 8 (eight) hours 10 mL 0     No current facility-administered medications for this visit  Review of Systems   HENT: Positive for congestion and sore throat  Respiratory: Positive for cough and wheezing  Musculoskeletal: Positive for arthralgias and back pain  All other systems reviewed and are negative  Objective:  Vitals:    03/17/23 1053   BP: 140/76   Pulse: 92   Resp: 18   Temp: 98 7 °F (37 1 °C)   SpO2: 100%      Physical Exam  Constitutional:       Comments: Alert overweight female patient who appears to be the stated age  She is verbalizing these respiratory complaints but is not in respiratory distress, not tachypneic, not dyspneic, and not using accessory muscles, although coughing occasionally  HENT:      Mouth/Throat:      Mouth: Mucous membranes are moist       Pharynx: Posterior oropharyngeal erythema and uvula swelling present  No oropharyngeal exudate  Comments: Ov alert erythema and slight edema  No upper airway obstruction  Cardiovascular:      Rate and Rhythm: Normal rate and regular rhythm  Pulmonary:      Effort: Pulmonary effort is normal  No accessory muscle usage, prolonged expiration, respiratory distress or retractions  Breath sounds: Examination of the right-middle field reveals wheezing  Examination of the left-middle field reveals wheezing  Wheezing and rhonchi present  No rales  Comments: Minimal wheeze and rhonchi mid lung fields bilaterally  Abdominal:      Palpations: Abdomen is soft  Neurological:      Mental Status: She is alert  Psychiatric:         Mood and Affect: Mood normal          Judgment: Judgment normal            Patient Instructions   Asthma exacerbation without danger signs    We will treat with Zithromax and a prednisone taper

## 2023-04-06 ENCOUNTER — TELEPHONE (OUTPATIENT)
Age: 48
End: 2023-04-06

## 2023-04-06 NOTE — TELEPHONE ENCOUNTER
PT LEFT FORM IN [facility]    PLEASE FILL OUT - CALL PT WHEN READY TO

## 2023-05-04 DIAGNOSIS — M47.819 SPONDYLOARTHROPATHY: Primary | ICD-10-CM

## 2023-05-04 RX ORDER — DIPHENHYDRAMINE HCL 25 MG
25 TABLET ORAL ONCE
Status: CANCELLED | OUTPATIENT
Start: 2023-05-05

## 2023-05-04 RX ORDER — METHYLPREDNISOLONE SODIUM SUCCINATE 40 MG/ML
40 INJECTION, POWDER, LYOPHILIZED, FOR SOLUTION INTRAMUSCULAR; INTRAVENOUS ONCE
Status: CANCELLED | OUTPATIENT
Start: 2023-05-05

## 2023-05-04 RX ORDER — ACETAMINOPHEN 325 MG/1
650 TABLET ORAL ONCE
Status: CANCELLED | OUTPATIENT
Start: 2023-05-05

## 2023-05-04 RX ORDER — SODIUM CHLORIDE 9 MG/ML
20 INJECTION, SOLUTION INTRAVENOUS ONCE
Status: CANCELLED | OUTPATIENT
Start: 2023-05-05

## 2023-05-05 ENCOUNTER — HOSPITAL ENCOUNTER (OUTPATIENT)
Dept: INFUSION CENTER | Facility: CLINIC | Age: 48
End: 2023-05-05

## 2023-05-05 VITALS — BODY MASS INDEX: 38.5 KG/M2 | RESPIRATION RATE: 18 BRPM | TEMPERATURE: 97 F | HEART RATE: 101 BPM | WEIGHT: 227.8 LBS

## 2023-05-05 DIAGNOSIS — M47.819 SPONDYLOARTHROPATHY: Primary | ICD-10-CM

## 2023-05-05 RX ORDER — DIPHENHYDRAMINE HCL 25 MG
25 TABLET ORAL ONCE
Status: COMPLETED | OUTPATIENT
Start: 2023-05-05 | End: 2023-05-05

## 2023-05-05 RX ORDER — DIPHENHYDRAMINE HCL 25 MG
25 TABLET ORAL ONCE
Status: CANCELLED | OUTPATIENT
Start: 2023-06-30

## 2023-05-05 RX ORDER — ACETAMINOPHEN 325 MG/1
650 TABLET ORAL ONCE
Status: CANCELLED | OUTPATIENT
Start: 2023-06-30

## 2023-05-05 RX ORDER — METHYLPREDNISOLONE SODIUM SUCCINATE 40 MG/ML
40 INJECTION, POWDER, LYOPHILIZED, FOR SOLUTION INTRAMUSCULAR; INTRAVENOUS ONCE
Status: COMPLETED | OUTPATIENT
Start: 2023-05-05 | End: 2023-05-05

## 2023-05-05 RX ORDER — METHYLPREDNISOLONE SODIUM SUCCINATE 40 MG/ML
40 INJECTION, POWDER, LYOPHILIZED, FOR SOLUTION INTRAMUSCULAR; INTRAVENOUS ONCE
Status: CANCELLED | OUTPATIENT
Start: 2023-06-30

## 2023-05-05 RX ORDER — SODIUM CHLORIDE 9 MG/ML
20 INJECTION, SOLUTION INTRAVENOUS ONCE
Status: COMPLETED | OUTPATIENT
Start: 2023-05-05 | End: 2023-05-05

## 2023-05-05 RX ORDER — ACETAMINOPHEN 325 MG/1
650 TABLET ORAL ONCE
Status: COMPLETED | OUTPATIENT
Start: 2023-05-05 | End: 2023-05-05

## 2023-05-05 RX ORDER — SODIUM CHLORIDE 9 MG/ML
20 INJECTION, SOLUTION INTRAVENOUS ONCE
Status: CANCELLED | OUTPATIENT
Start: 2023-06-30

## 2023-05-05 RX ADMIN — INFLIXIMAB 500 MG: 100 INJECTION, POWDER, LYOPHILIZED, FOR SOLUTION INTRAVENOUS at 09:46

## 2023-05-05 RX ADMIN — SODIUM CHLORIDE 20 ML/HR: 0.9 INJECTION, SOLUTION INTRAVENOUS at 09:05

## 2023-05-05 RX ADMIN — METHYLPREDNISOLONE SODIUM SUCCINATE 40 MG: 40 INJECTION, POWDER, FOR SOLUTION INTRAMUSCULAR; INTRAVENOUS at 09:04

## 2023-05-05 RX ADMIN — ACETAMINOPHEN 650 MG: 325 TABLET ORAL at 09:04

## 2023-05-05 RX ADMIN — DIPHENHYDRAMINE HYDROCHLORIDE 25 MG: 25 TABLET ORAL at 09:04

## 2023-05-05 NOTE — PROGRESS NOTES
Pt presents for remicade offering no compliants  Pt states no recent infection or antibiotic treatment  Pt tolerated treatment without incident  PIV removed  AVS printed  Next appointment reviewed

## 2023-05-07 NOTE — PROGRESS NOTES
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 who presents for a follow-up  She is currently receiving Remicade infusions 5 mg/kg every 8 weeks that was started in 1/2023  Tim Lara presents today for a follow-up of ankylosing spondylitis for which she is currently on Remicade infusions 5 mg/kg every 8 weeks that was started after the last visit  She reports with the medication she has noticed some improvement in her fatigue, malaise and back pain  To try and achieve an improved response I will shorten the duration in between infusions from every 8 weeks to every 6 weeks  She will update high risk medication lab monitoring anytime prior to the follow-up visit  Plan:  Diagnoses and all orders for this visit:    Ankylosing spondylitis of sacral region ([address] )    HLA B27 positive    Long-term use of immunosuppressant medication  -     CBC and differential; Future  -     Comprehensive metabolic panel; Future  -     C-reactive protein; Future  -     Sedimentation rate, automated; Future      Activities as tolerated  Exercise: try to maintain a low impact exercise regimen as much as possible  Walk for 30 minutes a day for at least 3 days a week  Continue other medications as prescribed by PCP and other specialists  RTC in 6 months  HPI     INITIAL VISIT NOTE (3/2022):  Ms Alejandra Gotti is a 50-year-old female with history significant for a possible peripheral spondyloarthropathy who presents to establish with [facility]  She was most recently on subcutaneous Humira 40 mg every 2 weeks  She is transferring care from [address] [place] [address] in [facility]  She is referred today by Dr Sabina Ramirez for a rheumatology consult      I do not have records available for review from her prior rheumatologist   Patient reports she was diagnosed with a possible spondyloarthropathy approximately 1 year ago and was following with a rheumatologist in [place]  This was diagnosed based on episodes of joint pain and swelling  She mentions she was initially on oral medications which were effective for her but they were discontinued due to side effects such as GI upset  She is unable to recall any of the names  She was then switched to subcutaneous Humira 40 mg every 2 weeks in June 2021 and was on this up until November 2021  She has not had follow-up with her rheumatologist since then so the medication was not refilled  She reports while on the Humira it did help with her symptoms of the joint pain and swelling but she would feel unwell as a result of the injection  Since then she has been managed with short courses of steroids as needed prescribed by her primary care physician  She is currently completing a course and is on 10 mg once daily  She reports while on the steroids she is completely asymptomatic  The joints that continue to bother her the most include her hands, elbows, feet as well as muscle spasms in her left lower back  At times she will still notice swelling of her feet  She denies sausage digits and states that her entire foot will swell  She experiences diffuse morning stiffness which improves quickly with activities  She has tried all the over-the-counter pain medications without any relief  She denies fevers, unintentional weight loss, inflammatory eye disease, skin rash, psoriasis, inflammatory bowel disease or a family history of autoimmune disease  She did have recent blood work done by her primary care physician which showed an elevated ESR and high sensitivity C-reactive protein of 59 and 25 7, respectively  An STEVE screen, rheumatoid factor, anti CCP antibody and hepatitis-C antibody were normal       12/12/2022:  Patient presents for a follow-up today    I reviewed the work-up done after the last office visit with the x-ray of the sacroiliac joints showing mildly increased bilateral sacroiliac joint space with sclerosis concerning for sacroiliitis  The blood work showed a positive HLA-B27 antigen  Sjogren's antibodies and a QuantiFERON-TB gold were normal     Patient reports she is primarily experiencing pain at this time in her neck, upper back and shoulder region along with a flulike sensation but without fevers  She is generally feeling unwell and is also describing widespread body aches with joint and muscle pain as well as fatigue  After receiving her results in March 2022 we did try to contact her to restart adalimumab but she did not get back to the office  5/8/2023:  Patient presents for a follow-up of ankylosing spondylitis  She is currently receiving Remicade infusions 5 mg/kg every 8 weeks that was started after the last visit  She reports with the Remicade infusions she has noticed some improvement in her fatigue as well as the back pain although it is still present  She reports the flulike sensation seems to have resolved  She mentions for a few days after her infusions she does get upper respiratory tract infection type symptoms but this resolves spontaneously  She has not had to go on antibiotics  She has managed to discontinue daily NSAID use and takes Advil only on an as-needed basis which does help  No recent steroids for joint related issues  The following portions of the patient's history were reviewed and updated as appropriate: allergies, current medications, past family history, past medical history, past social history, past surgical history and problem list       Review of Systems  Constitutional: Negative for weight change, fevers, chills, night sweats  Positive for fatigue  ENT/Mouth: Negative for hearing changes, ear pain, nasal congestion, sinus pain, hoarseness, sore throat, rhinorrhea, swallowing difficulty  Eyes: Negative for pain, redness, discharge, vision changes     Cardiovascular: Negative for chest pain, SOB, palpitations  Respiratory: Negative for cough, sputum, wheezing, dyspnea  Gastrointestinal: Negative for nausea, vomiting, diarrhea, constipation, pain, heartburn  Genitourinary: Negative for dysuria, urinary frequency, hematuria  Musculoskeletal: As per HPI  Skin: Negative for skin rash, color changes  Neuro: Negative for weakness, numbness, tingling, loss of consciousness  Psych: Negative for anxiety, depression  Heme/Lymph: Negative for easy bruising, bleeding, lymphadenopathy  History reviewed  No pertinent past medical history  Past Surgical History:   Procedure Laterality Date   • STOMACH SURGERY     • TONSILLECTOMY     • TUBAL LIGATION         Social History     Socioeconomic History   • Marital status: /Civil Union     Spouse name: Not on file   • Number of children: Not on file   • Years of education: Not on file   • Highest education level: Not on file   Occupational History   • Not on file   Tobacco Use   • Smoking status: Former   • Smokeless tobacco: Current   Vaping Use   • Vaping Use: Never used   Substance and Sexual Activity   • Alcohol use: Yes   • Drug use: Not Currently   • Sexual activity: Yes     Partners: Male   Other Topics Concern   • Not on file   Social History Narrative   • Not on file     Social Determinants of Health     Financial Resource Strain: Not on file   Food Insecurity: Not on file   Transportation Needs: Not on file   Physical Activity: Not on file   Stress: Stress Concern Present   • Feeling of Stress :  To some extent   Social Connections: Not on file   Intimate Partner Violence: Not on file   Housing Stability: Not on file       Family History   Problem Relation Age of Onset   • Breast cancer Mother         Masectomy   • Breast cancer Maternal Aunt         Ymph node removal       No Known Allergies      Current Outpatient Medications:   •  albuterol (PROVENTIL HFA,VENTOLIN HFA) 90 mcg/act inhaler, INHALE 2 PUFFS EVERY 6 HOURS AS NEEDED FOR WHEEZING, Disp: 6 7 g, Rfl: 0  •  budesonide-formoterol (Symbicort) 160-4 5 mcg/act inhaler, Inhale 2 puffs 2 (two) times a day Symbicort Brand medically necessary, Disp: 10 2 g, Rfl: 11  •  cetirizine (ZyrTEC) 10 mg tablet, Take 10 mg by mouth daily, Disp: , Rfl:   •  cyclobenzaprine (FLEXERIL) 10 mg tablet, Take 1 tablet (10 mg total) by mouth 2 (two) times a day as needed for muscle spasms, Disp: 20 tablet, Rfl: 0  •  montelukast (SINGULAIR) 10 mg tablet, TAKE 1 TABLET BY MOUTH DAILY AT BEDTIME, Disp: 90 tablet, Rfl: 1  •  neomycin-polymyxin-hydrocortisone (CORTISPORIN) 1 % SOLN, Administer 3 drops to the right ear every 8 (eight) hours, Disp: 10 mL, Rfl: 0  •  predniSONE 10 mg tablet, TAKE 4TABS/DAY X3DAYS, 3TABS/DAY X3DAYS, 2TABS/DAY X3DAYS THEN 1TAB/DAY X3DAYS, Disp: 30 tablet, Rfl: 0  No current facility-administered medications for this visit  Objective:    Vitals:    05/08/23 1126   BP: 125/85   Pulse: 85       Physical Exam  General: Well appearing, well nourished, in no distress  Oriented x 3, normal mood and affect  Ambulating without difficulty  Skin: Good turgor, no rash, unusual bruising or prominent lesions  Hair: Normal texture and distribution  Nails: Normal color, no deformities  HEENT:  Head: Normocephalic, atraumatic  Eyes: Conjunctiva clear, sclera non-icteric, EOM intact  Extremities: No amputations or deformities, cyanosis, edema  Neurologic: Alert and oriented  No focal neurological deficits appreciated  Psychiatric: Normal mood and affect  ANTONIO Nunez    Rheumatology

## 2023-05-08 ENCOUNTER — OFFICE VISIT (OUTPATIENT)
Dept: RHEUMATOLOGY | Facility: CLINIC | Age: 48
End: 2023-05-08

## 2023-05-08 ENCOUNTER — TELEPHONE (OUTPATIENT)
Dept: RHEUMATOLOGY | Facility: CLINIC | Age: 48
End: 2023-05-08

## 2023-05-08 VITALS — DIASTOLIC BLOOD PRESSURE: 85 MMHG | SYSTOLIC BLOOD PRESSURE: 125 MMHG | HEART RATE: 85 BPM

## 2023-05-08 DIAGNOSIS — Z79.60 LONG-TERM USE OF IMMUNOSUPPRESSANT MEDICATION: ICD-10-CM

## 2023-05-08 DIAGNOSIS — M45.8 ANKYLOSING SPONDYLITIS OF SACRAL REGION (HCC): Primary | ICD-10-CM

## 2023-05-08 DIAGNOSIS — Z15.89 HLA B27 POSITIVE: ICD-10-CM

## 2023-05-08 NOTE — TELEPHONE ENCOUNTER
Please check if prior auth required if we change Remicade dosing cycle from every 8 weeks to every 6 weeks

## 2023-05-12 ENCOUNTER — OFFICE VISIT (OUTPATIENT)
Age: 48
End: 2023-05-12

## 2023-05-12 ENCOUNTER — APPOINTMENT (OUTPATIENT)
Age: 48
End: 2023-05-12

## 2023-05-12 VITALS
WEIGHT: 226.4 LBS | HEART RATE: 91 BPM | DIASTOLIC BLOOD PRESSURE: 76 MMHG | TEMPERATURE: 97.6 F | HEIGHT: 64 IN | SYSTOLIC BLOOD PRESSURE: 112 MMHG | OXYGEN SATURATION: 98 % | RESPIRATION RATE: 18 BRPM | BODY MASS INDEX: 38.65 KG/M2

## 2023-05-12 DIAGNOSIS — Z12.11 COLON CANCER SCREENING: ICD-10-CM

## 2023-05-12 DIAGNOSIS — E66.01 MORBID OBESITY (HCC): ICD-10-CM

## 2023-05-12 DIAGNOSIS — J45.20 MILD INTERMITTENT ASTHMA WITHOUT COMPLICATION: ICD-10-CM

## 2023-05-12 DIAGNOSIS — Z79.60 LONG-TERM USE OF IMMUNOSUPPRESSANT MEDICATION: Primary | ICD-10-CM

## 2023-05-12 DIAGNOSIS — Z12.31 ENCOUNTER FOR SCREENING MAMMOGRAM FOR BREAST CANCER: ICD-10-CM

## 2023-05-12 DIAGNOSIS — M47.819 SPONDYLOARTHROPATHY: ICD-10-CM

## 2023-05-12 DIAGNOSIS — M47.819 SPONDYLOARTHROPATHY: Primary | ICD-10-CM

## 2023-05-12 PROBLEM — Z23 ENCOUNTER FOR IMMUNIZATION: Status: ACTIVE | Noted: 2023-05-12

## 2023-05-12 LAB
ALBUMIN SERPL BCP-MCNC: 3.5 G/DL (ref 3.5–5)
ALP SERPL-CCNC: 73 U/L (ref 46–116)
ALT SERPL W P-5'-P-CCNC: 30 U/L (ref 12–78)
ANION GAP SERPL CALCULATED.3IONS-SCNC: 2 MMOL/L (ref 4–13)
AST SERPL W P-5'-P-CCNC: 26 U/L (ref 5–45)
BASOPHILS # BLD AUTO: 0.06 THOUSANDS/ÂΜL (ref 0–0.1)
BASOPHILS NFR BLD AUTO: 1 % (ref 0–1)
BILIRUB SERPL-MCNC: 0.38 MG/DL (ref 0.2–1)
BUN SERPL-MCNC: 11 MG/DL (ref 5–25)
CALCIUM SERPL-MCNC: 9.1 MG/DL (ref 8.3–10.1)
CHLORIDE SERPL-SCNC: 106 MMOL/L (ref 96–108)
CO2 SERPL-SCNC: 28 MMOL/L (ref 21–32)
CREAT SERPL-MCNC: 0.86 MG/DL (ref 0.6–1.3)
CRP SERPL QL: 12 MG/L
EOSINOPHIL # BLD AUTO: 0.14 THOUSAND/ÂΜL (ref 0–0.61)
EOSINOPHIL NFR BLD AUTO: 2 % (ref 0–6)
ERYTHROCYTE [DISTWIDTH] IN BLOOD BY AUTOMATED COUNT: 13.5 % (ref 11.6–15.1)
ERYTHROCYTE [SEDIMENTATION RATE] IN BLOOD: 18 MM/HOUR (ref 0–19)
GFR SERPL CREATININE-BSD FRML MDRD: 79 ML/MIN/1.73SQ M
GLUCOSE P FAST SERPL-MCNC: 69 MG/DL (ref 65–99)
HCT VFR BLD AUTO: 41.7 % (ref 34.8–46.1)
HGB BLD-MCNC: 14.1 G/DL (ref 11.5–15.4)
IMM GRANULOCYTES # BLD AUTO: 0.11 THOUSAND/UL (ref 0–0.2)
IMM GRANULOCYTES NFR BLD AUTO: 1 % (ref 0–2)
LYMPHOCYTES # BLD AUTO: 2.63 THOUSANDS/ÂΜL (ref 0.6–4.47)
LYMPHOCYTES NFR BLD AUTO: 29 % (ref 14–44)
MCH RBC QN AUTO: 31.3 PG (ref 26.8–34.3)
MCHC RBC AUTO-ENTMCNC: 33.8 G/DL (ref 31.4–37.4)
MCV RBC AUTO: 93 FL (ref 82–98)
MONOCYTES # BLD AUTO: 0.89 THOUSAND/ÂΜL (ref 0.17–1.22)
MONOCYTES NFR BLD AUTO: 10 % (ref 4–12)
NEUTROPHILS # BLD AUTO: 5.31 THOUSANDS/ÂΜL (ref 1.85–7.62)
NEUTS SEG NFR BLD AUTO: 57 % (ref 43–75)
NRBC BLD AUTO-RTO: 0 /100 WBCS
PLATELET # BLD AUTO: 360 THOUSANDS/UL (ref 149–390)
PMV BLD AUTO: 10.3 FL (ref 8.9–12.7)
POTASSIUM SERPL-SCNC: 4 MMOL/L (ref 3.5–5.3)
PROT SERPL-MCNC: 7.2 G/DL (ref 6.4–8.4)
RBC # BLD AUTO: 4.5 MILLION/UL (ref 3.81–5.12)
SODIUM SERPL-SCNC: 136 MMOL/L (ref 135–147)
WBC # BLD AUTO: 9.14 THOUSAND/UL (ref 4.31–10.16)

## 2023-05-12 RX ORDER — SODIUM CHLORIDE 9 MG/ML
20 INJECTION, SOLUTION INTRAVENOUS ONCE
OUTPATIENT
Start: 2023-06-16

## 2023-05-12 RX ORDER — METHYLPREDNISOLONE SODIUM SUCCINATE 40 MG/ML
40 INJECTION, POWDER, LYOPHILIZED, FOR SOLUTION INTRAMUSCULAR; INTRAVENOUS ONCE
OUTPATIENT
Start: 2023-06-16

## 2023-05-12 RX ORDER — ACETAMINOPHEN 325 MG/1
650 TABLET ORAL ONCE
OUTPATIENT
Start: 2023-06-16

## 2023-05-12 RX ORDER — DIPHENHYDRAMINE HCL 25 MG
25 TABLET ORAL ONCE
OUTPATIENT
Start: 2023-06-16

## 2023-05-12 RX ORDER — MONTELUKAST SODIUM 10 MG/1
10 TABLET ORAL
Qty: 90 TABLET | Refills: 1 | Status: SHIPPED | OUTPATIENT
Start: 2023-05-12

## 2023-05-12 NOTE — PATIENT INSTRUCTIONS
Patient with the above issues  We will refer for mammogram and colonoscopy    Initiate Mounjaro or similar GLP drug for weight control

## 2023-05-12 NOTE — PROGRESS NOTES
Assessment/Plan:       Diagnoses and all orders for this visit:    Spondyloarthropathy    Mild intermittent asthma without complication  -     montelukast (SINGULAIR) 10 mg tablet; Take 1 tablet (10 mg total) by mouth daily at bedtime    Encounter for screening mammogram for breast cancer  -     Mammo screening bilateral w 3d & cad; Future    Colon cancer screening  -     Ambulatory referral for colonoscopy; Future    Morbid obesity ([facility] [address] )  -     tirzepatide 2 5 MG/0 5ML; Inject 0 5 mL (2 5 mg total) under the skin every 7 days    Other orders  -     INFLIXIMAB IV; Inject into a catheter in a vein                Subjective:      Patient ID: Jelly Arroyo is a 51 y o  female  Follow-up visit  This 50-year-old patient has ankylosing spondylitis and is treated with Remicade  She continues to have back pain and muscle spasm which I believe is part of this package  Mild intermittent asthma; rare episodes  She was treated back in March  She had some kind of injections for attempted pain control but it was not successful  She reports some weight gain      Employed as a    Rare  cigarettes, no excessive alcohol, no illicit drugs  No vaping  No high risk sexual behavior  Surgical history of tonsillectomy and tubal ligation     Family history of stroke and hypertension    Right now she has had a relatively stable baseline though of course the pain level is not acceptable  Behind on mammogram and colonoscopy which can be ordered               The following portions of the patient's history were reviewed and updated as appropriate:   She has no past medical history on file  ,  does not have any pertinent problems on file  ,   has a past surgical history that includes Tonsillectomy; Stomach surgery; and Tubal ligation  ,  family history includes Breast cancer in her maternal aunt and mother  ,   reports that she has quit smoking  She uses smokeless tobacco  She reports current alcohol use   She reports that she does not currently use drugs  ,  has No Known Allergies     Current Outpatient Medications   Medication Sig Dispense Refill   • albuterol (PROVENTIL HFA,VENTOLIN HFA) 90 mcg/act inhaler INHALE 2 PUFFS EVERY 6 HOURS AS NEEDED FOR WHEEZING 6 7 g 0   • budesonide-formoterol (Symbicort) 160-4 5 mcg/act inhaler Inhale 2 puffs 2 (two) times a day Symbicort Brand medically necessary 10 2 g 11   • cetirizine (ZyrTEC) 10 mg tablet Take 10 mg by mouth daily     • INFLIXIMAB IV Inject into a catheter in a vein     • montelukast (SINGULAIR) 10 mg tablet Take 1 tablet (10 mg total) by mouth daily at bedtime 90 tablet 1   • tirzepatide 2 5 MG/0 5ML Inject 0 5 mL (2 5 mg total) under the skin every 7 days 2 mL 0   • cyclobenzaprine (FLEXERIL) 10 mg tablet Take 1 tablet (10 mg total) by mouth 2 (two) times a day as needed for muscle spasms (Patient not taking: Reported on 5/12/2023) 20 tablet 0   • neomycin-polymyxin-hydrocortisone (CORTISPORIN) 1 % SOLN Administer 3 drops to the right ear every 8 (eight) hours (Patient not taking: Reported on 5/12/2023) 10 mL 0     No current facility-administered medications for this visit  Review of Systems   Constitutional: Positive for unexpected weight change  Musculoskeletal: Positive for arthralgias and back pain  Objective:  Vitals:    05/12/23 0823   BP: 112/76   Pulse: 91   Resp: 18   Temp: 97 6 °F (36 4 °C)   SpO2: 98%      Physical Exam  Constitutional:       Appearance: She is well-developed  She is not diaphoretic  Comments: Overweight female patient who appears to be the stated age   HENT:      Head: Normocephalic and atraumatic  Nose: No rhinorrhea  Eyes:      Pupils: Pupils are equal, round, and reactive to light  Neck:      Thyroid: No thyromegaly  Trachea: No tracheal deviation  Cardiovascular:      Rate and Rhythm: Normal rate and regular rhythm  Heart sounds: Normal heart sounds  No murmur heard  No gallop     Pulmonary: Effort: No respiratory distress  Breath sounds: No wheezing or rales  Abdominal:      General: Bowel sounds are normal       Palpations: Abdomen is soft  Tenderness: There is no abdominal tenderness  Musculoskeletal:         General: No tenderness or deformity  Normal range of motion  Cervical back: Normal range of motion and neck supple  Skin:     General: Skin is warm  Neurological:      Mental Status: She is alert and oriented to person, place, and time  Coordination: Coordination normal    Psychiatric:         Judgment: Judgment normal            Patient Instructions   Patient with the above issues  We will refer for mammogram and colonoscopy    Initiate Mounjaro or similar GLP drug for weight control

## 2023-05-12 NOTE — TELEPHONE ENCOUNTER
Dates changed, please call patient or infusion center and let them know to reschedule next infusion to 6/16

## 2023-06-15 ENCOUNTER — TELEPHONE (OUTPATIENT)
Dept: RHEUMATOLOGY | Facility: CLINIC | Age: 48
End: 2023-06-15

## 2023-06-15 DIAGNOSIS — M47.819 SPONDYLOARTHROPATHY: Primary | ICD-10-CM

## 2023-06-15 RX ORDER — DIPHENHYDRAMINE HCL 25 MG
25 TABLET ORAL ONCE
Status: CANCELLED | OUTPATIENT
Start: 2023-06-20

## 2023-06-15 RX ORDER — SODIUM CHLORIDE 9 MG/ML
20 INJECTION, SOLUTION INTRAVENOUS ONCE
Status: CANCELLED | OUTPATIENT
Start: 2023-06-20

## 2023-06-15 RX ORDER — METHYLPREDNISOLONE SODIUM SUCCINATE 40 MG/ML
40 INJECTION, POWDER, LYOPHILIZED, FOR SOLUTION INTRAMUSCULAR; INTRAVENOUS ONCE
Status: CANCELLED | OUTPATIENT
Start: 2023-06-20

## 2023-06-15 RX ORDER — ACETAMINOPHEN 325 MG/1
650 TABLET ORAL ONCE
Status: CANCELLED | OUTPATIENT
Start: 2023-06-20

## 2023-06-15 NOTE — TELEPHONE ENCOUNTER
Spoke with infusion center, patient is now scheduled for 6/20 @ 1 30pm for her next infusion so could you please change the dates on the therapy plan, thank you

## 2023-06-20 ENCOUNTER — HOSPITAL ENCOUNTER (OUTPATIENT)
Dept: INFUSION CENTER | Facility: CLINIC | Age: 48
Discharge: HOME | End: 2023-06-20
Payer: COMMERCIAL

## 2023-06-20 VITALS
BODY MASS INDEX: 39.03 KG/M2 | SYSTOLIC BLOOD PRESSURE: 140 MMHG | RESPIRATION RATE: 18 BRPM | TEMPERATURE: 96.9 F | HEART RATE: 90 BPM | WEIGHT: 227.4 LBS | DIASTOLIC BLOOD PRESSURE: 84 MMHG

## 2023-06-20 DIAGNOSIS — M47.819 SPONDYLOARTHROPATHY: Primary | ICD-10-CM

## 2023-06-20 PROCEDURE — 96375 TX/PRO/DX INJ NEW DRUG ADDON: CPT

## 2023-06-20 PROCEDURE — 96413 CHEMO IV INFUSION 1 HR: CPT

## 2023-06-20 PROCEDURE — 96415 CHEMO IV INFUSION ADDL HR: CPT

## 2023-06-20 RX ORDER — DIPHENHYDRAMINE HCL 25 MG
25 TABLET ORAL ONCE
Status: COMPLETED | OUTPATIENT
Start: 2023-06-20 | End: 2023-06-20

## 2023-06-20 RX ORDER — DIPHENHYDRAMINE HCL 25 MG
25 TABLET ORAL ONCE
OUTPATIENT
Start: 2023-08-01

## 2023-06-20 RX ORDER — METHYLPREDNISOLONE SODIUM SUCCINATE 40 MG/ML
40 INJECTION, POWDER, LYOPHILIZED, FOR SOLUTION INTRAMUSCULAR; INTRAVENOUS ONCE
OUTPATIENT
Start: 2023-08-01

## 2023-06-20 RX ORDER — METHYLPREDNISOLONE SODIUM SUCCINATE 40 MG/ML
40 INJECTION, POWDER, LYOPHILIZED, FOR SOLUTION INTRAMUSCULAR; INTRAVENOUS ONCE
Status: COMPLETED | OUTPATIENT
Start: 2023-06-20 | End: 2023-06-20

## 2023-06-20 RX ORDER — SODIUM CHLORIDE 9 MG/ML
20 INJECTION, SOLUTION INTRAVENOUS ONCE
Status: COMPLETED | OUTPATIENT
Start: 2023-06-20 | End: 2023-06-20

## 2023-06-20 RX ORDER — SODIUM CHLORIDE 9 MG/ML
20 INJECTION, SOLUTION INTRAVENOUS ONCE
OUTPATIENT
Start: 2023-08-01

## 2023-06-20 RX ORDER — ACETAMINOPHEN 325 MG/1
650 TABLET ORAL ONCE
OUTPATIENT
Start: 2023-08-01

## 2023-06-20 RX ORDER — ACETAMINOPHEN 325 MG/1
650 TABLET ORAL ONCE
Status: COMPLETED | OUTPATIENT
Start: 2023-06-20 | End: 2023-06-20

## 2023-06-20 RX ADMIN — ACETAMINOPHEN 650 MG: 325 TABLET, FILM COATED ORAL at 14:08

## 2023-06-20 RX ADMIN — DIPHENHYDRAMINE HYDROCHLORIDE 25 MG: 25 TABLET ORAL at 14:08

## 2023-06-20 RX ADMIN — METHYLPREDNISOLONE SODIUM SUCCINATE 40 MG: 40 INJECTION, POWDER, FOR SOLUTION INTRAMUSCULAR; INTRAVENOUS at 14:08

## 2023-06-20 RX ADMIN — SODIUM CHLORIDE 20 ML/HR: 0.9 INJECTION, SOLUTION INTRAVENOUS at 14:09

## 2023-06-20 RX ADMIN — INFLIXIMAB 500 MG: 100 INJECTION, POWDER, LYOPHILIZED, FOR SOLUTION INTRAVENOUS at 14:57

## 2023-06-20 NOTE — PROGRESS NOTES
Pt to clinic for remicade infusion  Pt offers no complaints today, denies abx use and denies s/s of infection  Tolerated infusion without complications  Pt aware of next appointment  PIV removed  AVS was offered, pt declined  Pt ambulated out of clinic safely

## 2023-07-17 ENCOUNTER — TELEPHONE (OUTPATIENT)
Dept: RHEUMATOLOGY | Facility: CLINIC | Age: 48
End: 2023-07-17

## 2023-07-21 NOTE — TELEPHONE ENCOUNTER
PRIOR AUTH done via phone with [facility] [phone].  Edwina Worley #[id number] and I'm faxing clinicals to [phone]

## 2023-07-26 ENCOUNTER — TELEPHONE (OUTPATIENT)
Dept: OBGYN CLINIC | Facility: HOSPITAL | Age: 48
End: 2023-07-26

## 2023-07-26 NOTE — TELEPHONE ENCOUNTER
Caller: Adriel Caldwell    Doctor: Gilma Quesada    Reason for call: Remicade Authorized.  EQDJ#[id number], 8/1/23-7/30/24    Call back#: [phone]

## 2023-08-01 ENCOUNTER — HOSPITAL ENCOUNTER (OUTPATIENT)
Dept: INFUSION CENTER | Facility: CLINIC | Age: 48
Discharge: HOME | End: 2023-08-01
Payer: COMMERCIAL

## 2023-08-01 VITALS
RESPIRATION RATE: 18 BRPM | TEMPERATURE: 98 F | WEIGHT: 220.4 LBS | DIASTOLIC BLOOD PRESSURE: 79 MMHG | BODY MASS INDEX: 37.83 KG/M2 | SYSTOLIC BLOOD PRESSURE: 134 MMHG | HEART RATE: 86 BPM

## 2023-08-01 DIAGNOSIS — M47.819 SPONDYLOARTHROPATHY: Primary | ICD-10-CM

## 2023-08-01 PROCEDURE — 96415 CHEMO IV INFUSION ADDL HR: CPT

## 2023-08-01 PROCEDURE — 96375 TX/PRO/DX INJ NEW DRUG ADDON: CPT

## 2023-08-01 PROCEDURE — 96413 CHEMO IV INFUSION 1 HR: CPT

## 2023-08-01 RX ORDER — SODIUM CHLORIDE 9 MG/ML
20 INJECTION, SOLUTION INTRAVENOUS ONCE
OUTPATIENT
Start: 2023-09-12

## 2023-08-01 RX ORDER — METHYLPREDNISOLONE SODIUM SUCCINATE 40 MG/ML
40 INJECTION, POWDER, LYOPHILIZED, FOR SOLUTION INTRAMUSCULAR; INTRAVENOUS ONCE
Status: COMPLETED | OUTPATIENT
Start: 2023-08-01 | End: 2023-08-01

## 2023-08-01 RX ORDER — ACETAMINOPHEN 325 MG/1
650 TABLET ORAL ONCE
Status: COMPLETED | OUTPATIENT
Start: 2023-08-01 | End: 2023-08-01

## 2023-08-01 RX ORDER — SODIUM CHLORIDE 9 MG/ML
20 INJECTION, SOLUTION INTRAVENOUS ONCE
Status: COMPLETED | OUTPATIENT
Start: 2023-08-01 | End: 2023-08-01

## 2023-08-01 RX ORDER — DIPHENHYDRAMINE HCL 25 MG
25 TABLET ORAL ONCE
OUTPATIENT
Start: 2023-09-12

## 2023-08-01 RX ORDER — DIPHENHYDRAMINE HCL 25 MG
25 TABLET ORAL ONCE
Status: COMPLETED | OUTPATIENT
Start: 2023-08-01 | End: 2023-08-01

## 2023-08-01 RX ORDER — METHYLPREDNISOLONE SODIUM SUCCINATE 40 MG/ML
40 INJECTION, POWDER, LYOPHILIZED, FOR SOLUTION INTRAMUSCULAR; INTRAVENOUS ONCE
OUTPATIENT
Start: 2023-09-12

## 2023-08-01 RX ORDER — ACETAMINOPHEN 325 MG/1
650 TABLET ORAL ONCE
OUTPATIENT
Start: 2023-09-12

## 2023-08-01 RX ADMIN — DIPHENHYDRAMINE HYDROCHLORIDE 25 MG: 25 TABLET ORAL at 08:42

## 2023-08-01 RX ADMIN — ACETAMINOPHEN 650 MG: 325 TABLET, FILM COATED ORAL at 08:42

## 2023-08-01 RX ADMIN — INFLIXIMAB 500 MG: 100 INJECTION, POWDER, LYOPHILIZED, FOR SOLUTION INTRAVENOUS at 09:21

## 2023-08-01 RX ADMIN — SODIUM CHLORIDE 20 ML/HR: 0.9 INJECTION, SOLUTION INTRAVENOUS at 08:42

## 2023-08-01 RX ADMIN — METHYLPREDNISOLONE SODIUM SUCCINATE 40 MG: 40 INJECTION, POWDER, FOR SOLUTION INTRAMUSCULAR; INTRAVENOUS at 08:42

## 2023-08-01 NOTE — PROGRESS NOTES
Pt presents for remicade offering no compliants. Pt states no recent infection or antibiotic treatment. Pt tolerated treatment without incident. PIV removed. AVS declined. Next appointment reviewed.

## 2023-09-08 ENCOUNTER — TELEPHONE (OUTPATIENT)
Age: 48
End: 2023-09-08

## 2023-09-25 DIAGNOSIS — M47.819 SPONDYLOARTHROPATHY: Primary | ICD-10-CM

## 2023-09-25 RX ORDER — SODIUM CHLORIDE 9 MG/ML
20 INJECTION, SOLUTION INTRAVENOUS ONCE
Status: CANCELLED | OUTPATIENT
Start: 2023-09-27

## 2023-09-25 RX ORDER — METHYLPREDNISOLONE SODIUM SUCCINATE 40 MG/ML
40 INJECTION, POWDER, LYOPHILIZED, FOR SOLUTION INTRAMUSCULAR; INTRAVENOUS ONCE
Status: CANCELLED | OUTPATIENT
Start: 2023-09-27

## 2023-09-25 RX ORDER — DIPHENHYDRAMINE HCL 25 MG
25 TABLET ORAL ONCE
Status: CANCELLED | OUTPATIENT
Start: 2023-09-27

## 2023-09-25 RX ORDER — ACETAMINOPHEN 325 MG/1
650 TABLET ORAL ONCE
Status: CANCELLED | OUTPATIENT
Start: 2023-09-27

## 2023-09-27 ENCOUNTER — HOSPITAL ENCOUNTER (OUTPATIENT)
Dept: INFUSION CENTER | Facility: CLINIC | Age: 48
Discharge: HOME | End: 2023-09-27
Payer: COMMERCIAL

## 2023-09-27 VITALS
HEART RATE: 97 BPM | SYSTOLIC BLOOD PRESSURE: 133 MMHG | DIASTOLIC BLOOD PRESSURE: 74 MMHG | BODY MASS INDEX: 37.66 KG/M2 | TEMPERATURE: 98.3 F | WEIGHT: 219.4 LBS | RESPIRATION RATE: 18 BRPM

## 2023-09-27 DIAGNOSIS — M47.819 SPONDYLOARTHROPATHY: Primary | ICD-10-CM

## 2023-09-27 PROCEDURE — 96375 TX/PRO/DX INJ NEW DRUG ADDON: CPT

## 2023-09-27 PROCEDURE — 96415 CHEMO IV INFUSION ADDL HR: CPT

## 2023-09-27 PROCEDURE — 96413 CHEMO IV INFUSION 1 HR: CPT

## 2023-09-27 RX ORDER — SODIUM CHLORIDE 9 MG/ML
20 INJECTION, SOLUTION INTRAVENOUS ONCE
Status: COMPLETED | OUTPATIENT
Start: 2023-09-27 | End: 2023-09-27

## 2023-09-27 RX ORDER — DIPHENHYDRAMINE HCL 25 MG
25 TABLET ORAL ONCE
OUTPATIENT
Start: 2023-11-08

## 2023-09-27 RX ORDER — SODIUM CHLORIDE 9 MG/ML
20 INJECTION, SOLUTION INTRAVENOUS ONCE
OUTPATIENT
Start: 2023-11-08

## 2023-09-27 RX ORDER — DIPHENHYDRAMINE HCL 25 MG
25 TABLET ORAL ONCE
Status: COMPLETED | OUTPATIENT
Start: 2023-09-27 | End: 2023-09-27

## 2023-09-27 RX ORDER — ACETAMINOPHEN 325 MG/1
650 TABLET ORAL ONCE
OUTPATIENT
Start: 2023-11-08

## 2023-09-27 RX ORDER — METHYLPREDNISOLONE SODIUM SUCCINATE 40 MG/ML
40 INJECTION, POWDER, LYOPHILIZED, FOR SOLUTION INTRAMUSCULAR; INTRAVENOUS ONCE
OUTPATIENT
Start: 2023-11-08

## 2023-09-27 RX ORDER — ACETAMINOPHEN 325 MG/1
650 TABLET ORAL ONCE
Status: COMPLETED | OUTPATIENT
Start: 2023-09-27 | End: 2023-09-27

## 2023-09-27 RX ORDER — METHYLPREDNISOLONE SODIUM SUCCINATE 40 MG/ML
40 INJECTION, POWDER, LYOPHILIZED, FOR SOLUTION INTRAMUSCULAR; INTRAVENOUS ONCE
Status: COMPLETED | OUTPATIENT
Start: 2023-09-27 | End: 2023-09-27

## 2023-09-27 RX ADMIN — INFLIXIMAB 500 MG: 100 INJECTION, POWDER, LYOPHILIZED, FOR SOLUTION INTRAVENOUS at 11:07

## 2023-09-27 RX ADMIN — ACETAMINOPHEN 650 MG: 325 TABLET, FILM COATED ORAL at 10:28

## 2023-09-27 RX ADMIN — DIPHENHYDRAMINE HYDROCHLORIDE 25 MG: 25 TABLET ORAL at 10:28

## 2023-09-27 RX ADMIN — METHYLPREDNISOLONE SODIUM SUCCINATE 40 MG: 40 INJECTION, POWDER, FOR SOLUTION INTRAMUSCULAR; INTRAVENOUS at 10:28

## 2023-09-27 RX ADMIN — SODIUM CHLORIDE 20 ML/HR: 0.9 INJECTION, SOLUTION INTRAVENOUS at 10:29

## 2023-09-27 NOTE — PROGRESS NOTES
Pt presents for remicade offering no compliants. Pt stated no recent infection or antibiotic treatment. Pt tolerated treatment without incident. PIV removed. AVS declined. Next appointment reviewed.

## 2023-10-18 ENCOUNTER — TELEPHONE (OUTPATIENT)
Age: 48
End: 2023-10-18

## 2023-10-18 NOTE — TELEPHONE ENCOUNTER
Patient would like note to say that she does not need any special equipment for work, and that Remicade is the immunosuppressant that she is on that is requiring her to work from home.

## 2023-10-18 NOTE — TELEPHONE ENCOUNTER
Caller: Nohemi Angel     Doctor: Reyna Tate    Reason for call: Would like to speak with the nurse about a letter for work that she recently requested    Call back# [phone]

## 2023-10-19 NOTE — TELEPHONE ENCOUNTER
Caller: Patient    Doctor: Saurav Joe    Reason for call: Unable to locate note in [address] [place] [address]. Questioned if it was uploaded yet?  Please call to confirm    Call back#: [phone]

## 2023-10-23 DIAGNOSIS — J45.20 MILD INTERMITTENT ASTHMA WITHOUT COMPLICATION: ICD-10-CM

## 2023-10-23 RX ORDER — MONTELUKAST SODIUM 10 MG/1
10 TABLET ORAL
Qty: 90 TABLET | Refills: 0 | Status: SHIPPED | OUTPATIENT
Start: 2023-10-23

## 2023-11-09 ENCOUNTER — TELEPHONE (OUTPATIENT)
Age: 48
End: 2023-11-09

## 2023-11-09 NOTE — TELEPHONE ENCOUNTER
Caller: Patient    Doctor: Elisabet Humphrey    Reason for call: Patient is calling stating she showed up at her appt and wasn't advised it was canceled. She is very upset and was told to call our office to set up a virtual appt. Spoke with office they will contact patient to set up virtual appt. Patient stated she has to drive and hour and a half back to her home.     Call back#: [phone]

## 2023-11-13 ENCOUNTER — HOSPITAL ENCOUNTER (OUTPATIENT)
Dept: INFUSION CENTER | Facility: CLINIC | Age: 48
Discharge: HOME | End: 2023-11-13
Payer: COMMERCIAL

## 2023-11-13 VITALS
RESPIRATION RATE: 18 BRPM | SYSTOLIC BLOOD PRESSURE: 141 MMHG | HEART RATE: 94 BPM | TEMPERATURE: 97.2 F | BODY MASS INDEX: 39.03 KG/M2 | WEIGHT: 227.4 LBS | DIASTOLIC BLOOD PRESSURE: 78 MMHG

## 2023-11-13 DIAGNOSIS — M47.819 SPONDYLOARTHROPATHY: Primary | ICD-10-CM

## 2023-11-13 PROCEDURE — 96415 CHEMO IV INFUSION ADDL HR: CPT

## 2023-11-13 PROCEDURE — 96375 TX/PRO/DX INJ NEW DRUG ADDON: CPT

## 2023-11-13 PROCEDURE — 96413 CHEMO IV INFUSION 1 HR: CPT

## 2023-11-13 RX ORDER — SODIUM CHLORIDE 9 MG/ML
20 INJECTION, SOLUTION INTRAVENOUS ONCE
Status: COMPLETED | OUTPATIENT
Start: 2023-11-13 | End: 2023-11-13

## 2023-11-13 RX ORDER — ACETAMINOPHEN 325 MG/1
650 TABLET ORAL ONCE
Status: COMPLETED | OUTPATIENT
Start: 2023-11-13 | End: 2023-11-13

## 2023-11-13 RX ORDER — SODIUM CHLORIDE 9 MG/ML
20 INJECTION, SOLUTION INTRAVENOUS ONCE
OUTPATIENT
Start: 2023-12-20

## 2023-11-13 RX ORDER — METHYLPREDNISOLONE SODIUM SUCCINATE 40 MG/ML
40 INJECTION, POWDER, LYOPHILIZED, FOR SOLUTION INTRAMUSCULAR; INTRAVENOUS ONCE
Status: COMPLETED | OUTPATIENT
Start: 2023-11-13 | End: 2023-11-13

## 2023-11-13 RX ORDER — DIPHENHYDRAMINE HCL 25 MG
25 TABLET ORAL ONCE
Status: COMPLETED | OUTPATIENT
Start: 2023-11-13 | End: 2023-11-13

## 2023-11-13 RX ORDER — METHYLPREDNISOLONE SODIUM SUCCINATE 40 MG/ML
40 INJECTION, POWDER, LYOPHILIZED, FOR SOLUTION INTRAMUSCULAR; INTRAVENOUS ONCE
OUTPATIENT
Start: 2023-12-20

## 2023-11-13 RX ORDER — ACETAMINOPHEN 325 MG/1
650 TABLET ORAL ONCE
OUTPATIENT
Start: 2023-12-20

## 2023-11-13 RX ORDER — DIPHENHYDRAMINE HCL 25 MG
25 TABLET ORAL ONCE
OUTPATIENT
Start: 2023-12-20

## 2023-11-13 RX ADMIN — ACETAMINOPHEN 650 MG: 325 TABLET, FILM COATED ORAL at 11:49

## 2023-11-13 RX ADMIN — SODIUM CHLORIDE 20 ML/HR: 0.9 INJECTION, SOLUTION INTRAVENOUS at 11:47

## 2023-11-13 RX ADMIN — INFLIXIMAB 498 MG: 100 INJECTION, POWDER, LYOPHILIZED, FOR SOLUTION INTRAVENOUS at 12:35

## 2023-11-13 RX ADMIN — METHYLPREDNISOLONE SODIUM SUCCINATE 40 MG: 40 INJECTION, POWDER, FOR SOLUTION INTRAMUSCULAR; INTRAVENOUS at 11:48

## 2023-11-13 RX ADMIN — DIPHENHYDRAMINE HYDROCHLORIDE 25 MG: 25 TABLET ORAL at 11:49

## 2023-11-13 NOTE — PROGRESS NOTES
Pt to clinic for remicade infusion. Pt offers no complaints today, denies abx use and denies s/s of infection. Tolerated infusion without complications. Pt aware of next appointment. PIV removed. AVS was offered, pt declined. Pt ambulated out of clinic safely.

## 2023-12-03 NOTE — PROGRESS NOTES
Assessment and Plan:   Ms. 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 She is currently receiving Remicade infusions 5 mg/kg every 6 weeks that was started in 1/2023. # Ankylosing spondylitis  Urvashi Freeman presents today for a follow-up of ankylosing spondylitis for which she is currently on Remicade infusions 5 mg/kg every 6 weeks. Overall she has noticed an improvement in her symptoms with starting the Remicade but continues to be symptomatic in specific regions such as her low back and right shoulder at this time. To further evaluate for osteoarthritis of the right shoulder or rotator cuff tendinopathy I recommend an x-ray to start off with and also a referral to orthopedics. I suspect less likely this is related to an inflammatory arthritis. I encouraged her to contact her insurance to determine if she is eligible for a medical massage which may help with the pain that she is experiencing in her right sided mid back region. For the chronic low back pain I recommend a referral to physical therapy as she did not improve with interventional measures. I do not see any indication to change to Remicade infusions at this time. She will update high risk medication lab monitoring in the next 1 to 2 months. Plan:  Diagnoses and all orders for this visit:    Ankylosing spondylitis of sacral region ([address])  -     C-reactive protein; Future  -     Sedimentation rate, automated; Future  -     Ambulatory Referral to Physical Therapy; Future    HLA B27 positive    Long-term use of immunosuppressant medication  -     CBC and differential; Future  -     Comprehensive metabolic panel; Future    Chronic right shoulder pain  -     XR shoulder 2+ vw right; Future  -     Ambulatory Referral to Orthopedic Surgery;  Future    Other orders  -     atorvastatin (LIPITOR) 40 mg tablet; TAKE 1 TABLET BY MOUTH EVERY DAY FOR 30 DAYS      Activities as tolerated. Exercise: try to maintain a low impact exercise regimen as much as possible. Walk for 30 minutes a day for at least 3 days a week. Continue other medications as prescribed by PCP and other specialists. RTC in 9 months. HPI     INITIAL VISIT NOTE (3/2022):  Ms. Danii Morris is a 58-year-old female with history significant for a possible peripheral spondyloarthropathy who presents to establish with [facility]. She was most recently on subcutaneous Humira 40 mg every 2 weeks. She is transferring care from [address] [place] [address],[address] in [place]. She is referred today by Dr. Katherine Melissa for a rheumatology consult. I do not have records available for review from her prior rheumatologist.  Patient reports she was diagnosed with a possible spondyloarthropathy approximately 1 year ago and was following with a rheumatologist in [place]. This was diagnosed based on episodes of joint pain and swelling. She mentions she was initially on oral medications which were effective for her but they were discontinued due to side effects such as GI upset. She is unable to recall any of the names. She was then switched to subcutaneous Humira 40 mg every 2 weeks in June 2021 and was on this up until November 2021. She has not had follow-up with her rheumatologist since then so the medication was not refilled. She reports while on the Humira it did help with her symptoms of the joint pain and swelling but she would feel unwell as a result of the injection. Since then she has been managed with short courses of steroids as needed prescribed by her primary care physician. She is currently completing a course and is on 10 mg once daily. She reports while on the steroids she is completely asymptomatic. The joints that continue to bother her the most include her hands, elbows, feet as well as muscle spasms in her left lower back.   At times she will still notice swelling of her feet. She denies sausage digits and states that her entire foot will swell. She experiences diffuse morning stiffness which improves quickly with activities. She has tried all the over-the-counter pain medications without any relief. She denies fevers, unintentional weight loss, inflammatory eye disease, skin rash, psoriasis, inflammatory bowel disease or a family history of autoimmune disease. She did have recent blood work done by her primary care physician which showed an elevated ESR and high sensitivity C-reactive protein of 59 and 25.7, respectively. An STEVE screen, rheumatoid factor, anti CCP antibody and hepatitis-C antibody were normal.      12/12/2022:  Patient presents for a follow-up today. I reviewed the work-up done after the last office visit with the x-ray of the sacroiliac joints showing mildly increased bilateral sacroiliac joint space with sclerosis concerning for sacroiliitis. The blood work showed a positive HLA-B27 antigen. Sjogren's antibodies and a QuantiFERON-TB gold were normal.    Patient reports she is primarily experiencing pain at this time in her neck, upper back and shoulder region along with a flulike sensation but without fevers. She is generally feeling unwell and is also describing widespread body aches with joint and muscle pain as well as fatigue. After receiving her results in March 2022 we did try to contact her to restart adalimumab but she did not get back to the office. 5/8/2023:  Patient presents for a follow-up of ankylosing spondylitis. She is currently receiving Remicade infusions 5 mg/kg every 8 weeks that was started after the last visit. She reports with the Remicade infusions she has noticed some improvement in her fatigue as well as the back pain although it is still present. She reports the flulike sensation seems to have resolved.   She mentions for a few days after her infusions she does get upper respiratory tract infection type symptoms but this resolves spontaneously. She has not had to go on antibiotics. She has managed to discontinue daily NSAID use and takes Advil only on an as-needed basis which does help. No recent steroids for joint related issues. 12/4/2023:  Patient presents for a follow-up of ankylosing spondylitis. She is currently receiving Remicade infusions 5 mg/kg every 6 weeks. I reviewed her labs done after the last visit which showed an elevated C-reactive protein of 12. A CBC, CMP and ESR were unremarkable. She reports overall with initiation of Remicade and changing the frequency she definitely feels like it helps with her overall joint pains and swelling. At times she will still notice puffiness in her knees and also describes chronic low back pain as well as pain in her knees and ankles. She has also been appreciating pain affecting her right shoulder along with restricted range of motion. She requires the help of her left arm to raise her right arm. Pain is also noted in her right mid back region with occasional spasms. She has overall been tolerating the Remicade infusions fairly well but reports in the first 2 weeks after the medication she feels she is more prone to infections if she has exposure with sick contacts. The following portions of the patient's history were reviewed and updated as appropriate: allergies, current medications, past family history, past medical history, past social history, past surgical history and problem list.      Review of Systems  Constitutional: Negative for weight change, fevers, chills, night sweats. Positive for fatigue. ENT/Mouth: Negative for hearing changes, ear pain, nasal congestion, sinus pain, hoarseness, sore throat, rhinorrhea, swallowing difficulty. Eyes: Negative for pain, redness, discharge, vision changes. Cardiovascular: Negative for chest pain, SOB, palpitations.    Respiratory: Negative for cough, sputum, wheezing, dyspnea. Gastrointestinal: Negative for nausea, vomiting, diarrhea, constipation, pain, heartburn. Genitourinary: Negative for dysuria, urinary frequency, hematuria. Musculoskeletal: As per HPI. Skin: Negative for skin rash, color changes. Neuro: Negative for weakness, numbness, tingling, loss of consciousness. Psych: Negative for anxiety, depression. Heme/Lymph: Negative for easy bruising, bleeding, lymphadenopathy. No past medical history on file. Past Surgical History:   Procedure Laterality Date    STOMACH SURGERY      TONSILLECTOMY      TUBAL LIGATION         Social History     Socioeconomic History    Marital status: /Civil Union     Spouse name: Not on file    Number of children: Not on file    Years of education: Not on file    Highest education level: Not on file   Occupational History    Not on file   Tobacco Use    Smoking status: Former    Smokeless tobacco: Current   Vaping Use    Vaping Use: Never used   Substance and Sexual Activity    Alcohol use: Yes    Drug use: Not Currently    Sexual activity: Yes     Partners: Male   Other Topics Concern    Not on file   Social History Narrative    Not on file     Social Determinants of Health     Financial Resource Strain: Not on file   Food Insecurity: Not on file   Transportation Needs: Not on file   Physical Activity: Not on file   Stress: Stress Concern Present (5/12/2023)    [facility]     Feeling of Stress :  To some extent   Social Connections: Not on file   Intimate Partner Violence: Not on file   Housing Stability: Not on file       Family History   Problem Relation Age of Onset    Breast cancer Mother         Masectomy    Breast cancer Maternal Aunt         Ymph node removal       No Known Allergies      Current Outpatient Medications:     albuterol (PROVENTIL HFA,VENTOLIN HFA) 90 mcg/act inhaler, INHALE 2 PUFFS EVERY 6 HOURS AS NEEDED FOR WHEEZING, Disp: 6.7 g, Rfl: 0    atorvastatin (LIPITOR) 40 mg tablet, TAKE 1 TABLET BY MOUTH EVERY DAY FOR 30 DAYS, Disp: , Rfl:     budesonide-formoterol (Symbicort) 160-4.5 mcg/act inhaler, Inhale 2 puffs 2 (two) times a day Symbicort Brand medically necessary, Disp: 10.2 g, Rfl: 11    cetirizine (ZyrTEC) 10 mg tablet, Take 10 mg by mouth daily, Disp: , Rfl:     INFLIXIMAB IV, Inject into a catheter in a vein, Disp: , Rfl:     montelukast (SINGULAIR) 10 mg tablet, TAKE 1 TABLET DAILY AT BEDTIME, Disp: 90 tablet, Rfl: 0    tirzepatide 2.5 MG/0.5ML, Inject 0.5 mL (2.5 mg total) under the skin every 7 days, Disp: 2 mL, Rfl: 0    cyclobenzaprine (FLEXERIL) 10 mg tablet, Take 1 tablet (10 mg total) by mouth 2 (two) times a day as needed for muscle spasms (Patient not taking: Reported on 5/12/2023), Disp: 20 tablet, Rfl: 0    neomycin-polymyxin-hydrocortisone (CORTISPORIN) 1 % SOLN, Administer 3 drops to the right ear every 8 (eight) hours (Patient not taking: Reported on 5/12/2023), Disp: 10 mL, Rfl: 0      Objective:    Vitals:    12/04/23 1024   BP: 138/88   Weight: 102 kg (225 lb)   Height: 5' 4" (1.626 m)       Physical Exam  General: Well appearing, well nourished, in no distress. Oriented x 3, normal mood and affect. Ambulating without difficulty. Skin: Good turgor, no rash, unusual bruising or prominent lesions. Hair: Normal texture and distribution. Nails: Normal color, no deformities. HEENT:  Head: Normocephalic, atraumatic. Eyes: Conjunctiva clear, sclera non-icteric, EOM intact. Extremities: No amputations or deformities, cyanosis, edema. Neurologic: Alert and oriented. No focal neurological deficits appreciated. Psychiatric: Normal mood and affect. Olivier Alvarez M.D.   Rheumatology

## 2023-12-04 ENCOUNTER — OFFICE VISIT (OUTPATIENT)
Dept: RHEUMATOLOGY | Facility: CLINIC | Age: 48
End: 2023-12-04
Payer: COMMERCIAL

## 2023-12-04 VITALS
SYSTOLIC BLOOD PRESSURE: 138 MMHG | WEIGHT: 225 LBS | HEIGHT: 64 IN | BODY MASS INDEX: 38.41 KG/M2 | DIASTOLIC BLOOD PRESSURE: 88 MMHG

## 2023-12-04 DIAGNOSIS — Z79.60 LONG-TERM USE OF IMMUNOSUPPRESSANT MEDICATION: ICD-10-CM

## 2023-12-04 DIAGNOSIS — M25.511 CHRONIC RIGHT SHOULDER PAIN: ICD-10-CM

## 2023-12-04 DIAGNOSIS — M45.8 ANKYLOSING SPONDYLITIS OF SACRAL REGION (HCC): Primary | ICD-10-CM

## 2023-12-04 DIAGNOSIS — Z15.89 HLA B27 POSITIVE: ICD-10-CM

## 2023-12-04 DIAGNOSIS — G89.29 CHRONIC RIGHT SHOULDER PAIN: ICD-10-CM

## 2023-12-04 PROCEDURE — 99214 OFFICE O/P EST MOD 30 MIN: CPT | Performed by: INTERNAL MEDICINE

## 2023-12-04 RX ORDER — ATORVASTATIN CALCIUM 40 MG/1
TABLET, FILM COATED ORAL
COMMUNITY
Start: 2023-11-28

## 2023-12-15 ENCOUNTER — APPOINTMENT (OUTPATIENT)
Dept: RADIOLOGY | Facility: CLINIC | Age: 48
End: 2023-12-15
Payer: COMMERCIAL

## 2023-12-15 ENCOUNTER — OFFICE VISIT (OUTPATIENT)
Dept: OBGYN CLINIC | Facility: CLINIC | Age: 48
End: 2023-12-15
Payer: COMMERCIAL

## 2023-12-15 VITALS
BODY MASS INDEX: 38.93 KG/M2 | WEIGHT: 228 LBS | HEIGHT: 64 IN | SYSTOLIC BLOOD PRESSURE: 142 MMHG | DIASTOLIC BLOOD PRESSURE: 85 MMHG | HEART RATE: 91 BPM

## 2023-12-15 DIAGNOSIS — M25.511 CHRONIC RIGHT SHOULDER PAIN: ICD-10-CM

## 2023-12-15 DIAGNOSIS — S39.012A LUMBAR STRAIN, INITIAL ENCOUNTER: ICD-10-CM

## 2023-12-15 DIAGNOSIS — G89.29 CHRONIC RIGHT SHOULDER PAIN: ICD-10-CM

## 2023-12-15 DIAGNOSIS — M53.3 SI (SACROILIAC) JOINT DYSFUNCTION: ICD-10-CM

## 2023-12-15 DIAGNOSIS — M75.41 SUBACROMIAL IMPINGEMENT OF RIGHT SHOULDER: Primary | ICD-10-CM

## 2023-12-15 DIAGNOSIS — R19.8 ABDOMINAL WEAKNESS: ICD-10-CM

## 2023-12-15 DIAGNOSIS — M47.816 LUMBAR FACET ARTHROPATHY: ICD-10-CM

## 2023-12-15 PROCEDURE — 99203 OFFICE O/P NEW LOW 30 MIN: CPT | Performed by: FAMILY MEDICINE

## 2023-12-15 PROCEDURE — 73030 X-RAY EXAM OF SHOULDER: CPT

## 2023-12-15 NOTE — PROGRESS NOTES
Assessment/Plan:  Assessment/Plan   Diagnoses and all orders for this visit:    Subacromial impingement of right shoulder  -     Ambulatory Referral to Orthopedic Surgery  -     XR shoulder 2+ vw right; Future  -     Ambulatory Referral to Physical Therapy; Future    Lumbar facet arthropathy  -     Ambulatory Referral to Physical Therapy; Future    Lumbar strain, initial encounter  -     Ambulatory Referral to Physical Therapy; Future    SI (sacroiliac) joint dysfunction  -     Ambulatory Referral to Physical Therapy; Future    Abdominal weakness  -     Ambulatory Referral to Physical Therapy; Future      58-year-old right-hand-dominant female with low back pain more than 1 year duration and right shoulder pain more than 1 month duration. Discussed with patient physical exam, radiographs, impression, and plan. X-rays right shoulder noted for subacromial spurring. Physical exam cervical spine noted for right paraspinal tenderness. She has intact range of motion cervical spine. There is positive axial load and negative Spurling's. Right shoulder for tenderness at the superior and lateral aspects. She is range of motion limited actively forward flexion 70 degrees and abduction 70 degrees with internal rotation to the sacrum. She has passive forward flexion to 160 degrees and passive abduction to 130 degrees. She has 4+/5 strength external rotation and supraspinatus. There is positive empty can test and positive Chen. She has normal strength, sensation, bicep reflex, and radial pulse both upper extremities. Clinical impression is that she has symptoms from impingement. I discussed treatment regimen of supplements, topical anti-inflammatory, and formal therapy. I advised surgery not warranted at this time. She declined steroid injection. She is to start taking turmeric vitamin at least 1000 mg daily, tart cherry vitamin at least 1000 mg daily, and glucosamine 2-3 times a day.   She may apply topical diclofenac gel 3 times daily for the next days. She is to start physical therapy as soon as possible and do home exercises as directed. She will follow-up as needed. Subjective:   Patient ID: Penny Reardon is a 51 y.o. female. Chief Complaint   Patient presents with    Right Shoulder - Pain        16-year-old right-hand-dominant female presents evaluation of right shoulder pain 1 month duration. She denies trauma or inciting event. Pain described as gradual in onset, generalized to the shoulder, rating distally along the upper arm, worse with elevating the arm above shoulder level, associated with limited range of motion, and improved with resting. She reports being unable to elevate the arm above shoulder actively, but is able to do so passively. She manages symptoms with alternating between Tylenol and ibuprofen. She also reports low back pain for more than 1 year from which she was given injection by pain management, but states it does not help. She was seen by rheumatologist, referred for x-rays, and referred to orthopedic care. Shoulder Pain  This is a new problem. The current episode started more than 1 month ago. The problem occurs daily. The problem has been gradually worsening. Associated symptoms include arthralgias, numbness and weakness. Pertinent negatives include no abdominal pain, chest pain, chills, fever, joint swelling, neck pain, rash or sore throat. Exacerbated by: Arm movement. She has tried rest, position changes, NSAIDs and acetaminophen for the symptoms. The treatment provided mild relief. The following portions of the patient's history were reviewed and updated as appropriate: She  has no past medical history on file. She has No Known Allergies. .    Review of Systems   Constitutional:  Negative for chills and fever. HENT:  Negative for sore throat. Eyes:  Negative for visual disturbance. Respiratory:  Negative for shortness of breath. Cardiovascular:  Negative for chest pain. Gastrointestinal:  Negative for abdominal pain. Genitourinary:  Negative for flank pain. Musculoskeletal:  Positive for arthralgias. Negative for joint swelling and neck pain. Skin:  Negative for rash and wound. Neurological:  Positive for weakness and numbness. Hematological:  Does not bruise/bleed easily. Psychiatric/Behavioral:  Negative for self-injury. Objective:  Vitals:    12/15/23 1025   BP: 142/85   Pulse: 91   Weight: 103 kg (228 lb)   Height: 5' 4" (1.626 m)      Back Exam     Comments:    Cervical spine  - Right paraspinal tenderness  - Limited range of motion 7 to both sides  - Positive axial load  - Negative Spurling's  - Normal sensation and bicep reflex both upper extremities      Right Hand Exam     Muscle Strength   Wrist extension: 5/5   Wrist flexion: 5/5   : 5/5     Other   Sensation: normal  Pulse: present      Left Hand Exam     Muscle Strength   Wrist extension: 5/5   Wrist flexion: 5/5   :  5/5     Other   Sensation: normal  Pulse: present      Right Elbow Exam     Tenderness   The patient is experiencing tenderness in the lateral epicondyle. Range of Motion   The patient has normal right elbow ROM. Muscle Strength   The patient has normal right elbow strength (5/5 flexion and extension). Other   Sensation: normal      Left Elbow Exam     Muscle Strength   Left elbow normal strength: 5/5 flexion and extension. Other   Sensation: normal      Right Shoulder Exam     Tenderness   Right shoulder tenderness location: Superior, lateral, posterior.     Range of Motion   Active abduction:  70   Passive abduction:  130   Forward flexion:  70 (Passive 160)   Internal rotation 0 degrees:  Sacrum     Muscle Strength   Abduction: 5/5   Internal rotation: 5/5     Tests   Chen test: positive    Other   Sensation: normal    Comments:  4+/5 external rotation and supraspinatus  Positive empty can test      Left Shoulder Exam     Muscle Strength   Abduction: 5/5     Other   Sensation: normal           Strength/Myotome Testing     Left Wrist/Hand   Wrist extension: 5  Wrist flexion: 5    Right Wrist/Hand   Wrist extension: 5  Wrist flexion: 5      Physical Exam  Vitals and nursing note reviewed. Constitutional:       Appearance: Normal appearance. She is well-developed. She is not ill-appearing or diaphoretic. HENT:      Head: Normocephalic and atraumatic. Right Ear: External ear normal.      Left Ear: External ear normal.   Eyes:      Conjunctiva/sclera: Conjunctivae normal.   Neck:      Trachea: No tracheal deviation. Cardiovascular:      Rate and Rhythm: Normal rate. Pulmonary:      Effort: Pulmonary effort is normal. No respiratory distress. Abdominal:      General: There is no distension. Musculoskeletal:         General: Tenderness present. No swelling, deformity or signs of injury. Skin:     General: Skin is warm and dry. Coloration: Skin is not jaundiced or pale. Neurological:      Mental Status: She is alert and oriented to person, place, and time. Psychiatric:         Mood and Affect: Mood normal.         Behavior: Behavior normal.         Thought Content: Thought content normal.         Judgment: Judgment normal.         I have personally reviewed pertinent films in PACS and my interpretation is  . X-rays right shoulder noted for subacromial spurring.

## 2023-12-15 NOTE — LETTER
December 15, 2023     Marie Cordero, [address]    Patient: Thomas Cavazos   YOB: 1975   Date of Visit: 12/15/2023       Dear Dr. Constantino Jennings:    Thank you for referring Thomas Cavazos to me for evaluation. Below are my notes for this consultation. If you have questions, please do not hesitate to call me. I look forward to following your patient along with you. Sincerely,        MARGARET Gonzalez, DO        CC: No Recipients    MARGARET Gonzalez, DO  12/15/2023 12:32 PM  Sign when Signing Visit  Assessment/Plan:  Assessment/Plan  Diagnoses and all orders for this visit:    Subacromial impingement of right shoulder  -     Ambulatory Referral to Orthopedic Surgery  -     XR shoulder 2+ vw right; Future  -     Ambulatory Referral to Physical Therapy; Future    Lumbar facet arthropathy  -     Ambulatory Referral to Physical Therapy; Future    Lumbar strain, initial encounter  -     Ambulatory Referral to Physical Therapy; Future    SI (sacroiliac) joint dysfunction  -     Ambulatory Referral to Physical Therapy; Future    Abdominal weakness  -     Ambulatory Referral to Physical Therapy; Future      39-year-old right-hand-dominant female with low back pain more than 1 year duration and right shoulder pain more than 1 month duration. Discussed with patient physical exam, radiographs, impression, and plan. X-rays right shoulder noted for subacromial spurring. Physical exam cervical spine noted for right paraspinal tenderness. She has intact range of motion cervical spine. There is positive axial load and negative Spurling's. Right shoulder for tenderness at the superior and lateral aspects. She is range of motion limited actively forward flexion 70 degrees and abduction 70 degrees with internal rotation to the sacrum. She has passive forward flexion to 160 degrees and passive abduction to 130 degrees. She has 4+/5 strength external rotation and supraspinatus. There is positive empty can test and positive Chen. She has normal strength, sensation, bicep reflex, and radial pulse both upper extremities. Clinical impression is that she has symptoms from impingement. I discussed treatment regimen of supplements, topical anti-inflammatory, and formal therapy. I advised surgery not warranted at this time. She declined steroid injection. She is to start taking turmeric vitamin at least 1000 mg daily, tart cherry vitamin at least 1000 mg daily, and glucosamine 2-3 times a day. She may apply topical diclofenac gel 3 times daily for the next days. She is to start physical therapy as soon as possible and do home exercises as directed. She will follow-up as needed. Subjective:   Patient ID: Unruly Mitchell is a 51 y.o. female. Chief Complaint   Patient presents with   • Right Shoulder - Pain        25-year-old right-hand-dominant female presents evaluation of right shoulder pain 1 month duration. She denies trauma or inciting event. Pain described as gradual in onset, generalized to the shoulder, rating distally along the upper arm, worse with elevating the arm above shoulder level, associated with limited range of motion, and improved with resting. She reports being unable to elevate the arm above shoulder actively, but is able to do so passively. She manages symptoms with alternating between Tylenol and ibuprofen. She also reports low back pain for more than 1 year from which she was given injection by pain management, but states it does not help. She was seen by rheumatologist, referred for x-rays, and referred to orthopedic care. Shoulder Pain  This is a new problem. The current episode started more than 1 month ago. The problem occurs daily. The problem has been gradually worsening. Associated symptoms include arthralgias, numbness and weakness.  Pertinent negatives include no abdominal pain, chest pain, chills, fever, joint swelling, neck pain, rash or sore throat. Exacerbated by: Arm movement. She has tried rest, position changes, NSAIDs and acetaminophen for the symptoms. The treatment provided mild relief. The following portions of the patient's history were reviewed and updated as appropriate: She  has no past medical history on file. She has No Known Allergies. .    Review of Systems   Constitutional:  Negative for chills and fever. HENT:  Negative for sore throat. Eyes:  Negative for visual disturbance. Respiratory:  Negative for shortness of breath. Cardiovascular:  Negative for chest pain. Gastrointestinal:  Negative for abdominal pain. Genitourinary:  Negative for flank pain. Musculoskeletal:  Positive for arthralgias. Negative for joint swelling and neck pain. Skin:  Negative for rash and wound. Neurological:  Positive for weakness and numbness. Hematological:  Does not bruise/bleed easily. Psychiatric/Behavioral:  Negative for self-injury. Objective:  Vitals:    12/15/23 1025   BP: 142/85   Pulse: 91   Weight: 103 kg (228 lb)   Height: 5' 4" (1.626 m)      Back Exam     Comments:    Cervical spine  - Right paraspinal tenderness  - Limited range of motion 7 to both sides  - Positive axial load  - Negative Spurling's  - Normal sensation and bicep reflex both upper extremities      Right Hand Exam     Muscle Strength   Wrist extension: 5/5   Wrist flexion: 5/5   : 5/5     Other   Sensation: normal  Pulse: present      Left Hand Exam     Muscle Strength   Wrist extension: 5/5   Wrist flexion: 5/5   :  5/5     Other   Sensation: normal  Pulse: present      Right Elbow Exam     Tenderness   The patient is experiencing tenderness in the lateral epicondyle. Range of Motion   The patient has normal right elbow ROM. Muscle Strength   The patient has normal right elbow strength (5/5 flexion and extension).     Other   Sensation: normal      Left Elbow Exam     Muscle Strength   Left elbow normal strength: 5/5 flexion and extension. Other   Sensation: normal      Right Shoulder Exam     Tenderness   Right shoulder tenderness location: Superior, lateral, posterior. Range of Motion   Active abduction:  70   Passive abduction:  130   Forward flexion:  70 (Passive 160)   Internal rotation 0 degrees:  Sacrum     Muscle Strength   Abduction: 5/5   Internal rotation: 5/5     Tests   Chen test: positive    Other   Sensation: normal    Comments:  4+/5 external rotation and supraspinatus  Positive empty can test      Left Shoulder Exam     Muscle Strength   Abduction: 5/5     Other   Sensation: normal           Strength/Myotome Testing     Left Wrist/Hand   Wrist extension: 5  Wrist flexion: 5    Right Wrist/Hand   Wrist extension: 5  Wrist flexion: 5      Physical Exam  Vitals and nursing note reviewed. Constitutional:       Appearance: Normal appearance. She is well-developed. She is not ill-appearing or diaphoretic. HENT:      Head: Normocephalic and atraumatic. Right Ear: External ear normal.      Left Ear: External ear normal.   Eyes:      Conjunctiva/sclera: Conjunctivae normal.   Neck:      Trachea: No tracheal deviation. Cardiovascular:      Rate and Rhythm: Normal rate. Pulmonary:      Effort: Pulmonary effort is normal. No respiratory distress. Abdominal:      General: There is no distension. Musculoskeletal:         General: Tenderness present. No swelling, deformity or signs of injury. Skin:     General: Skin is warm and dry. Coloration: Skin is not jaundiced or pale. Neurological:      Mental Status: She is alert and oriented to person, place, and time. Psychiatric:         Mood and Affect: Mood normal.         Behavior: Behavior normal.         Thought Content:  Thought content normal.         Judgment: Judgment normal.         I have personally reviewed pertinent films in PACS and my interpretation is .  X-rays right shoulder noted for subacromial spurring.

## 2023-12-15 NOTE — PATIENT INSTRUCTIONS
Take over-the-counter vitamins:    - turmeric vitamin at least 1000 mg daily    - Tart cherry vitamin at least 1000 mg daily    - Glucosamine-chondrointin 2 -3 times daily    Over the counter Topical diclofenac gel/voltaren   -3 times daily for 10 days

## 2024-01-19 DIAGNOSIS — J45.20 MILD INTERMITTENT ASTHMA WITHOUT COMPLICATION: ICD-10-CM

## 2024-01-19 RX ORDER — MONTELUKAST SODIUM 10 MG/1
10 TABLET ORAL
Qty: 90 TABLET | Refills: 3 | Status: SHIPPED | OUTPATIENT
Start: 2024-01-19

## 2024-02-01 DIAGNOSIS — M47.819 SPONDYLOARTHROPATHY: Primary | ICD-10-CM

## 2024-02-01 RX ORDER — SODIUM CHLORIDE 9 MG/ML
20 INJECTION, SOLUTION INTRAVENOUS ONCE
OUTPATIENT
Start: 2024-02-05

## 2024-02-01 RX ORDER — ACETAMINOPHEN 325 MG/1
650 TABLET ORAL ONCE
OUTPATIENT
Start: 2024-02-05

## 2024-02-01 RX ORDER — DIPHENHYDRAMINE HCL 25 MG
25 TABLET ORAL ONCE
OUTPATIENT
Start: 2024-02-05

## 2024-02-01 RX ORDER — METHYLPREDNISOLONE SODIUM SUCCINATE 40 MG/ML
40 INJECTION, POWDER, LYOPHILIZED, FOR SOLUTION INTRAMUSCULAR; INTRAVENOUS ONCE
OUTPATIENT
Start: 2024-02-05

## 2024-02-13 ENCOUNTER — TELEPHONE (OUTPATIENT)
Dept: BARIATRICS | Facility: CLINIC | Age: 49
End: 2024-02-13

## 2024-02-13 NOTE — TELEPHONE ENCOUNTER
Weight History     Highest Weight: 289#  Lowest Weight: 145# (2012 after gastric sleeve)    Past Attempts at Weight Loss: Self Created Diet, Exercise, Commerical Programs (Weight Watchers, Shannan Ayon, [employer] etc), Counseling with RD, and Bariatric Surgery    Food Recall  Coffee  Breakfast: -- (3x/week) Or 1-2 eggs w/ toast Or oatmeal Or Special K cereal --  Snack: --  Lunch (12-2 pm): hot dog Or sandwich Or tuna salad Or salad w/ protein Or leftovers   Snack: --  Dinner (6-7 pm): protein (chicken, beef, fish)/vegetable/carb (quinoa)  Snack: -- Or will have hot tea, cheese, or nuts    Beverages: water, coffee, hot tea, soda/juice/iced tea when dining out   Volume of Beverage Intake: 36-48 oz water     Frequency of Dining out: 1x/week    Would the patient benefit from visit with BH specialist?: Boredom Eating, Stress Eating, Grazing, and Mindless Snacking (usually 2x/month)    Physical Activity Intake  Activity: Walking or [place] (20 mins; started 3 weeks ago)  Frequency of Exercise: 5x/week  Physical Limitations to Exercise: knee pain due to weight    Review of Programs  Overview of medical weight management programs provided?: Yes   Is patient interested in discussing medication?: Yes   Is patient interested in discussing bariatric surgery?: Had Gastric sleeve in 2012; does not follow with surgical center since she moved in [place]  Does patient know which pathway they are interested in?: No

## 2024-02-15 DIAGNOSIS — M47.819 SPONDYLOARTHROPATHY: Primary | ICD-10-CM

## 2024-02-15 RX ORDER — ACETAMINOPHEN 325 MG/1
650 TABLET ORAL ONCE
OUTPATIENT
Start: 2024-02-19

## 2024-02-15 RX ORDER — METHYLPREDNISOLONE SODIUM SUCCINATE 40 MG/ML
40 INJECTION, POWDER, LYOPHILIZED, FOR SOLUTION INTRAMUSCULAR; INTRAVENOUS ONCE
OUTPATIENT
Start: 2024-02-19

## 2024-02-15 RX ORDER — SODIUM CHLORIDE 9 MG/ML
20 INJECTION, SOLUTION INTRAVENOUS ONCE
OUTPATIENT
Start: 2024-02-19

## 2024-02-15 RX ORDER — DIPHENHYDRAMINE HCL 25 MG
25 TABLET ORAL ONCE
OUTPATIENT
Start: 2024-02-19

## 2024-02-16 ENCOUNTER — TELEPHONE (OUTPATIENT)
Dept: INFUSION CENTER | Facility: CLINIC | Age: 49
End: 2024-02-16

## 2024-02-16 NOTE — TELEPHONE ENCOUNTER
Patient contacted via phone, per Dr. Fagan, pt to be rescheduled for after dental work and antibiotic therapy is completed. Appointment on 2/19/24 canceled at this time. Patient verbalized understanding that she will need to reschedule her infusion when he dental antibiotics are complete.

## 2024-02-19 ENCOUNTER — OFFICE VISIT (OUTPATIENT)
Dept: BARIATRICS | Facility: CLINIC | Age: 49
End: 2024-02-19

## 2024-02-19 ENCOUNTER — HOSPITAL ENCOUNTER (OUTPATIENT)
Dept: INFUSION CENTER | Facility: CLINIC | Age: 49
Discharge: HOME | End: 2024-02-19

## 2024-02-19 VITALS
WEIGHT: 208.4 LBS | DIASTOLIC BLOOD PRESSURE: 68 MMHG | HEART RATE: 98 BPM | BODY MASS INDEX: 35.58 KG/M2 | SYSTOLIC BLOOD PRESSURE: 112 MMHG | HEIGHT: 64 IN

## 2024-02-19 DIAGNOSIS — K59.09 CHRONIC CONSTIPATION: ICD-10-CM

## 2024-02-19 DIAGNOSIS — R53.83 FATIGUE: ICD-10-CM

## 2024-02-19 DIAGNOSIS — I10 ESSENTIAL HYPERTENSION: ICD-10-CM

## 2024-02-19 DIAGNOSIS — E03.9 HYPOTHYROIDISM, UNSPECIFIED TYPE: ICD-10-CM

## 2024-02-19 DIAGNOSIS — E55.9 VITAMIN D DEFICIENCY: ICD-10-CM

## 2024-02-19 DIAGNOSIS — K91.2 POSTSURGICAL MALABSORPTION: ICD-10-CM

## 2024-02-19 DIAGNOSIS — E66.01 CLASS 2 SEVERE OBESITY DUE TO EXCESS CALORIES WITH SERIOUS COMORBIDITY AND BODY MASS INDEX (BMI) OF 35.0 TO 35.9 IN ADULT: Primary | ICD-10-CM

## 2024-02-19 PROBLEM — E66.812 CLASS 2 SEVERE OBESITY DUE TO EXCESS CALORIES WITH SERIOUS COMORBIDITY AND BODY MASS INDEX (BMI) OF 35.0 TO 35.9 IN ADULT (HCC): Status: ACTIVE | Noted: 2024-02-19

## 2024-02-19 RX ORDER — FLUTICASONE FUROATE, UMECLIDINIUM BROMIDE AND VILANTEROL TRIFENATATE 100; 62.5; 25 UG/1; UG/1; UG/1
1 POWDER RESPIRATORY (INHALATION) DAILY
COMMUNITY
Start: 2024-01-12

## 2024-02-19 RX ORDER — NALTREXONE HYDROCHLORIDE 50 MG/1
TABLET, FILM COATED ORAL
Qty: 30 TABLET | Refills: 1 | Status: SHIPPED | OUTPATIENT
Start: 2024-02-19

## 2024-02-19 RX ORDER — OMEGA-3 FATTY ACIDS/FISH OIL 300-1000MG
1 CAPSULE ORAL DAILY
COMMUNITY

## 2024-02-19 RX ORDER — BUPROPION HYDROCHLORIDE 100 MG/1
100 TABLET ORAL 2 TIMES DAILY
Qty: 60 TABLET | Refills: 1 | Status: SHIPPED | OUTPATIENT
Start: 2024-02-19 | End: 2024-04-19

## 2024-02-19 RX ORDER — AMOXICILLIN 875 MG/1
875 TABLET, COATED ORAL 2 TIMES DAILY
COMMUNITY
Start: 2024-02-10

## 2024-02-19 RX ORDER — LOSARTAN POTASSIUM 50 MG/1
50 TABLET ORAL DAILY
COMMUNITY
Start: 2024-02-09

## 2024-02-19 NOTE — ASSESSMENT & PLAN NOTE
- Discussed options of HealthyCORE-Intensive Lifestyle Intervention Program, Very Low Calorie Diet-VLCD, and Conservative Program and the role of weight loss medications.  - Explained the importance of making lifestyle changes in addition to starting any anti-obesity medications.   - Patient is interested in pursuing Conservative Program and follow up visits with medical weight management provider.  - Initial weight loss goal of 5-10% weight loss for improved health  - Weight loss can improve patient's co-morbid conditions and/or prevent weight-related complications.  - Weight is not at goal and patient has been unable to achieve a meaningful weight loss above 5% using various programs and tools for more than 6 months  - Lab work was ordered today including lab work for bariatric surgery  -Patient lifestyle habits were reviewed and barriers to weight loss were addressed today.  Patient will be meeting with dietitian to better establish a nutrition routine and structure her meals.  Patient will consider discussing a partial meal replacement plan with the dietitian including meal replacement shakes and small low calorie/high-protein snacks.  Patient was encouraged to try to eat more throughout the day as I suspect she is under eating, which is contributing to her evening eating habits.  Activity was discussed and patient was encouraged to consider a more structured exercise routine at least 30 minutes 4-5 times a week.    Medications were discussed:  GLP-1 medications were discussed but due to chronic constipation would be used cautiously  Phentermine was discussed but patient would like to avoid due to anxiety and possibility of increased heart rate  Topiramate was discussed and patient denies any history of kidney stones  Bupropion/naltrexone was discussed and patient was interested in this medication to help with cravings and food addictive behaviors.    Bupropion/naltrexone instructions:  Begin with bupropion  100mg once daily in the evening and naltrexone 25mg (1/2 tablet) once daily in the morning.   After 1 week increase to bupropion 100mg twice daily in the morning and evening and the naltrexone 25mg (1/2 tablet) twice daily in the morning and evening    The potential side effects of bupropion/naltrexone may include: abdominal upset, headache, dizziness, trouble sleeping, increased blood pressure, depression/anxiety, and fatigue. Please call/return if you develops symptoms of depression or anxiety. You should go to the  ER for evaluation if you develop any thoughts of harming yourself or others occur.      Goals:  Do not skip meals.  Food log via tyra or provided paper log (tyra options include www.[employer].com, sparkpeople.com, loseit.com, calorieking.com, [employer])  No sugary beverages.   At least 64oz of water daily.  Increase physical activity by 10 minutes daily. Gradually increase physical activity to a goal of 5 days per week for 30 minutes of MODERATE intensity PLUS 2 days per week of FULL BODY resistance training

## 2024-02-19 NOTE — ASSESSMENT & PLAN NOTE
Thyroid lab work to be checked  Patient states thyroid replacement was no longer necessary after bariatric surgery

## 2024-02-19 NOTE — ASSESSMENT & PLAN NOTE
GLP-1 medications to be used cautiously for weight loss  Patient will continue with over-the-counter laxatives to keep bowels regular  Encouraged to increase fiber intake and increased water intake

## 2024-02-19 NOTE — PATIENT INSTRUCTIONS
Bupropion/naltrexone instructions:  Begin with bupropion 100mg once daily in the evening and naltrexone 25mg (1/2 tablet) once daily in the morning.   After 1 week increase to bupropion 100mg twice daily in the morning and evening and the naltrexone 25mg (1/2 tablet) twice daily in the morning and evening    The potential side effects of bupropion/naltrexone may include: abdominal upset, headache, dizziness, trouble sleeping, increased blood pressure, depression/anxiety, and fatigue. Please call/return if you develops symptoms of depression or anxiety. You should go to the  ER for evaluation if you develop any thoughts of harming yourself or others occur.

## 2024-02-19 NOTE — ASSESSMENT & PLAN NOTE
Treatment includes losartan 50 mg daily  Blood pressure well-controlled today  Will monitor while on bupropion treatment for weight loss

## 2024-02-19 NOTE — PROGRESS NOTES
Assessment/Plan:    Class 2 severe obesity due to excess calories with serious comorbidity and body mass index (BMI) of 35.0 to 35.9 in adult   - Discussed options of HealthyCORE-Intensive Lifestyle Intervention Program, Very Low Calorie Diet-VLCD, and Conservative Program and the role of weight loss medications.  - Explained the importance of making lifestyle changes in addition to starting any anti-obesity medications.   - Patient is interested in pursuing Conservative Program and follow up visits with medical weight management provider.  - Initial weight loss goal of 5-10% weight loss for improved health  - Weight loss can improve patient's co-morbid conditions and/or prevent weight-related complications.  - Weight is not at goal and patient has been unable to achieve a meaningful weight loss above 5% using various programs and tools for more than 6 months  - Lab work was ordered today including lab work for bariatric surgery  -Patient lifestyle habits were reviewed and barriers to weight loss were addressed today.  Patient will be meeting with dietitian to better establish a nutrition routine and structure her meals.  Patient will consider discussing a partial meal replacement plan with the dietitian including meal replacement shakes and small low calorie/high-protein snacks.  Patient was encouraged to try to eat more throughout the day as I suspect she is under eating, which is contributing to her evening eating habits.  Activity was discussed and patient was encouraged to consider a more structured exercise routine at least 30 minutes 4-5 times a week.    Medications were discussed:  GLP-1 medications were discussed but due to chronic constipation would be used cautiously  Phentermine was discussed but patient would like to avoid due to anxiety and possibility of increased heart rate  Topiramate was discussed and patient denies any history of kidney stones  Bupropion/naltrexone was discussed and patient was  interested in this medication to help with cravings and food addictive behaviors.    Bupropion/naltrexone instructions:  Begin with bupropion 100mg once daily in the evening and naltrexone 25mg (1/2 tablet) once daily in the morning.   After 1 week increase to bupropion 100mg twice daily in the morning and evening and the naltrexone 25mg (1/2 tablet) twice daily in the morning and evening    The potential side effects of bupropion/naltrexone may include: abdominal upset, headache, dizziness, trouble sleeping, increased blood pressure, depression/anxiety, and fatigue. Please call/return if you develops symptoms of depression or anxiety. You should go to the  ER for evaluation if you develop any thoughts of harming yourself or others occur.      Goals:  Do not skip meals.  Food log via tyra or provided paper log (tyra options include www.Orteq.com, sparkpeople.com, loseit.com, calorieking.com, Mytrus)  No sugary beverages.   At least 64oz of water daily.  Increase physical activity by 10 minutes daily. Gradually increase physical activity to a goal of 5 days per week for 30 minutes of MODERATE intensity PLUS 2 days per week of FULL BODY resistance training     Postsurgical malabsorption  Patient will establish care with bariatric surgery team    Chronic constipation  GLP-1 medications to be used cautiously for weight loss  Patient will continue with over-the-counter laxatives to keep bowels regular  Encouraged to increase fiber intake and increased water intake    Hypothyroidism  Thyroid lab work to be checked  Patient states thyroid replacement was no longer necessary after bariatric surgery    Essential hypertension  Treatment includes losartan 50 mg daily  Blood pressure well-controlled today  Will monitor while on bupropion treatment for weight loss         Marcella was seen today for follow-up.    Diagnoses and all orders for this visit:    Class 2 severe obesity due to excess calories with serious  comorbidity and body mass index (BMI) of 35.0 to 35.9 in adult   -     TSH, 3rd generation with Free T4 reflex; Future  -     Hemoglobin A1C; Future  -     Insulin, fasting; Future  -     Iron Panel (Includes Ferritin, Iron Sat%, Iron, and TIBC); Future  -     Vitamin D 25 hydroxy; Future  -     Vitamin B12; Future  -     buPROPion (WELLBUTRIN) 100 mg tablet; Take 1 tablet (100 mg total) by mouth 2 (two) times a day  -     naltrexone (REVIA) 50 mg tablet; Take 1/2 tablet (25mg) once daily in the morning then after 1 week increase to 1/2 tablet (25mg) twice daily in the morning and evening    Postsurgical malabsorption  -     TSH, 3rd generation with Free T4 reflex; Future  -     Hemoglobin A1C; Future  -     Insulin, fasting; Future  -     Iron Panel (Includes Ferritin, Iron Sat%, Iron, and TIBC); Future  -     Vitamin D 25 hydroxy; Future  -     Vitamin B12; Future  -     Folate; Future  -     Vitamin B1, whole blood; Future  -     PTH, intact; Future  -     Vitamin A; Future  -     Zinc; Future    Vitamin D deficiency  -     Vitamin D 25 hydroxy; Future    Fatigue  -     TSH, 3rd generation with Free T4 reflex; Future  -     Hemoglobin A1C; Future  -     Insulin, fasting; Future  -     Iron Panel (Includes Ferritin, Iron Sat%, Iron, and TIBC); Future  -     Vitamin D 25 hydroxy; Future  -     Vitamin B12; Future    Chronic constipation    Hypothyroidism, unspecified type    Essential hypertension         Patient denies personal history of pancreatitis. Patient also denies personal and family history of medullary thyroid cancer and multiple endocrine neoplasia type 2 (MEN 2 tumor). Patient denies any history of kidney stones, seizures, or glaucoma.    Medication consent was reviewed today and all questions were answered.  Patient agreed with all bulleted points.  Consent was signed, scanned into chart and patient was provided with a copy for their records.     Total time spent reviewing chart, interviewing patient,  examining patient, discussing plan, answering all questions, and documentin min, with >50% face-to-face time spent counseling patient on nonsurgical interventions for the treatment of excess weight. Discussed in detail nonsurgical options including intensive lifestyle intervention program, very low-calorie diet program and conservative program.  Discussed the role of weight loss medications.  Counseled patient on diet behavior and exercise modification for weight loss.    Follow up in approximately 2 months with Non-Surgical Physician/Advanced Practitioner.    Subjective:   Chief Complaint   Patient presents with    Follow-up     Pt is here for MWM consult       Patient ID: Marcella Avitia  is a 49 y.o. female with excess weight/obesity here to pursue weight management.  Previous notes and records have been reviewed.    History reviewed. No pertinent past medical history.  Past Surgical History:   Procedure Laterality Date    STOMACH SURGERY      TONSILLECTOMY      TUBAL LIGATION         HPI:  Wt Readings from Last 20 Encounters:   24 94.5 kg (208 lb 6.4 oz)   12/15/23 103 kg (228 lb)   23 102 kg (225 lb)   23 103 kg (227 lb 6.4 oz)   23 99.5 kg (219 lb 6.4 oz)   23 100 kg (220 lb 6.4 oz)   23 103 kg (227 lb 6.4 oz)   23 103 kg (226 lb 6.4 oz)   23 103 kg (227 lb 12.8 oz)   23 100 kg (221 lb)   23 100 kg (220 lb 12.8 oz)   23 98.9 kg (218 lb)   22 100 kg (221 lb)   22 101 kg (223 lb)   22 101 kg (223 lb 6.4 oz)   22 101 kg (223 lb)   22 100 kg (221 lb 3.2 oz)   03/10/22 95.7 kg (211 lb)   22 95.8 kg (211 lb 3.2 oz)   21 95.3 kg (210 lb)     Previsit Call Completed by Kathy Srivastava RD:  Weight History      Highest Weight: 289#  Lowest Weight: 145# (2012 after gastric sleeve)     Past Attempts at Weight Loss: Self Created Diet, Exercise, Commerical Programs (Weight Watchers, Shannan Ayon, NutriSystem  etc), Counseling with RD, and Bariatric Surgery     Food Recall  Coffee  Breakfast: -- (3x/week) Or 1-2 eggs w/ toast Or oatmeal Or Special K cereal --  Snack: --  Lunch (12-2 pm): hot dog Or sandwich Or tuna salad Or salad w/ protein Or leftovers   Snack: --  Dinner (6-7 pm): protein (chicken, beef, fish)/vegetable/carb (quinoa)  Snack: -- Or will have hot tea, cheese, or nuts     Beverages: water, coffee, hot tea, soda/juice/iced tea when dining out   Volume of Beverage Intake: 36-48 oz water      Frequency of Dining out: 1x/week     Would the patient benefit from visit with BH specialist?: Boredom Eating, Stress Eating, Grazing, and Mindless Snacking (usually 2x/month)     Physical Activity Intake  Activity: Walking or Devon (20 mins; started 3 weeks ago)  Frequency of Exercise: 5x/week  Physical Limitations to Exercise: knee pain due to weight     Review of Programs  Overview of medical weight management programs provided?: Yes   Is patient interested in discussing medication?: Yes   Is patient interested in discussing bariatric surgery?: Had Gastric sleeve in 2012; does not follow with surgical center since she moved in [place]  Does patient know which pathway they are interested in?: No          Patient presents today to medical weight management office for consult.  s/p gastric sleeve in 2012 at [facility]  Last follow up with bariatric surgery was in 2016    Patient has struggled with weight regain in the past few years and has tried many different over-the-counter products to help her weight loss.  She is currently using Unicity which is a supplement she takes before eating to help her feel full.  She was able to lose 10 pounds since the end of January using this product.  Patient has also been reducing her calories and has been walking more.  She has also started to do Devon a few days a week.  Patient no longer feels restriction from her surgery and is sometimes able to eat double portions while  other days she eats hardly anything at all.  Patient also admits there are nights where she wakes up at 10 PM with her stomach growling and she will eat a large amount of food and then go back to sleep.  Patient is currently on Remicade injections for her ankylosing spondylosis and states the increased weight has caused more pain in her joints.  Patient is also on losartan for high blood pressure and atorvastatin for high cholesterol.  Patient was considering the very low calorie diet but is concerned about the level restriction but would like some structured eating routine to get back on track.  She does report constipation often having to use over-the-counter laxatives to stay regular.    Initial weight: 289 lbs  Itz: 145-150 lbs      Hydration: water 24-48 oz, coffee  Alcohol: occasionally or socially  Smoking: when drinking alcohol  Exercise: walking and kennedy  Occupation:  (remote work)  Sleep: on and off  STOP bang: 3/8    Current weight: 208.4 lbs  Current BMI: 35.77  Current Waist Measurement: 47 in  Goal weight: 150 lbs      The following portions of the patient's history were reviewed and updated as appropriate: allergies, current medications, past family history, past medical history, past social history, past surgical history, and problem list.    Family History   Problem Relation Age of Onset    Breast cancer Mother         Masectomy    Breast cancer Maternal Aunt         Ymph node removal        Review of Systems   Constitutional:  Positive for fatigue.   HENT:  Negative for sore throat.    Respiratory:  Negative for cough and shortness of breath.    Cardiovascular:  Negative for chest pain, palpitations and leg swelling.   Gastrointestinal:  Positive for constipation. Negative for abdominal pain, diarrhea and nausea.   Genitourinary:  Negative for dysuria.   Musculoskeletal:  Positive for arthralgias. Negative for back pain.   Skin:  Negative for rash.   Neurological:  Negative for  "headaches.   Psychiatric/Behavioral:  Negative for dysphoric mood. The patient is nervous/anxious.        Objective:  /68 (BP Location: Left arm, Patient Position: Sitting, Cuff Size: Large)   Pulse 98   Ht 5' 4\" (1.626 m)   Wt 94.5 kg (208 lb 6.4 oz)   LMP 01/24/2024 (Exact Date)   BMI 35.77 kg/m²     Physical Exam  Vitals and nursing note reviewed.   Constitutional:       Appearance: Normal appearance. She is obese.   HENT:      Head: Normocephalic.   Pulmonary:      Effort: Pulmonary effort is normal.   Neurological:      General: No focal deficit present.      Mental Status: She is alert and oriented to person, place, and time.   Psychiatric:         Mood and Affect: Mood normal.         Behavior: Behavior normal.         Thought Content: Thought content normal.         Judgment: Judgment normal.              Labs and Imaging  Recent labs and imaging have been personally reviewed.  Lab Results   Component Value Date    WBC 9.14 05/12/2023    HGB 14.1 05/12/2023    HCT 41.7 05/12/2023    MCV 93 05/12/2023     05/12/2023     Lab Results   Component Value Date    SODIUM 136 05/12/2023    K 4.0 05/12/2023     05/12/2023    CO2 28 05/12/2023    AGAP 2 (L) 05/12/2023    BUN 11 05/12/2023    CREATININE 0.86 05/12/2023    GLUC 103 01/19/2022    GLUF 69 05/12/2023    CALCIUM 9.1 05/12/2023    AST 26 05/12/2023    ALT 30 05/12/2023    ALKPHOS 73 05/12/2023    TP 7.2 05/12/2023    TBILI 0.38 05/12/2023    EGFR 79 05/12/2023     No results found for: \"HGBA1C\"  Lab Results   Component Value Date    PEK8QOINOGYL 4.100 (H) 01/19/2022     Lab Results   Component Value Date    CHOLESTEROL 199 01/19/2022     Lab Results   Component Value Date    HDL 56 01/19/2022     Lab Results   Component Value Date    TRIG 157 (H) 01/19/2022     Lab Results   Component Value Date    LDLCALC 112 (H) 01/19/2022     "

## 2024-02-22 ENCOUNTER — TELEPHONE (OUTPATIENT)
Age: 49
End: 2024-02-22

## 2024-02-22 NOTE — TELEPHONE ENCOUNTER
Patient called in to reschedule dietician appointment. Warm transferred to [facility] for further assistance.

## 2024-02-23 ENCOUNTER — CLINICAL SUPPORT (OUTPATIENT)
Dept: BARIATRICS | Facility: CLINIC | Age: 49
End: 2024-02-23

## 2024-02-23 VITALS — BODY MASS INDEX: 35.61 KG/M2 | WEIGHT: 208.6 LBS | HEIGHT: 64 IN

## 2024-02-23 DIAGNOSIS — R63.5 ABNORMAL WEIGHT GAIN: Primary | ICD-10-CM

## 2024-02-23 PROCEDURE — RECHECK

## 2024-02-23 PROCEDURE — WMDI30

## 2024-02-23 NOTE — PROGRESS NOTES
Weight Management Medical Nutrition Assessment  Marcella was here today for meal planning.  Today she weighs 208.6 and has a goal to be between 175 - 180 lbs.  She had the sleeve at another facility in 2011 with her heaviest weight being over 300 lbs and her lowest weight being 145 after surgery. She has been eating one meal a day and is not logging her food.  Discussed the importance of not skipping meals.  Recommend that she start logging her food so she can see exactly where her calories are coming from.  Discussed having protein shakes as meal replacements especially since she tends to skip breakfast.  Also discussed following a partial meal plan.  Provided her with product samples.    Goal:   Logging all meals and snacks.  Start exercising 3 times a week for 30 minutes to start.         Patient seen by Medical Provider in past 6 months:  yes  Requested to schedule appointment with Medical Provider: No      Anthropometric Measurements  Start Weight (#): 208.4   Current Weight (#): 208.6  TBW % Change from start weight:0%  Ideal Body Weight (#):120  Goal Weight (#):175 - 180  Highest: almost 300 before surgery  Lowest: 145 after sleeve    Weight Loss History  Previous weight loss attempts: sleeve    Food and Nutrition Related History  Wake up: 6:30 luisito   Bed Time:10    Food Recall  Breakfast:skips     Snack:none  Lunch:eggs, toast  Snack:none  Dinner:protein, quinoa, veg  Snack:none      Beverages: water, coffee with lactaid or almond milk  Volume of beverage intake: 48     Weekends: Same  Cravings: carbs  Trouble area of day:mid day    Frequency of Eating out: irregularly  Food restrictions:rice causses bloating  Cooking: self   Food Shopping: self    Physical Activity Intake  Activity:Walking and kennedy  Frequency:infrequently  Physical limitations/barriers to exercise: none    Estimated Needs  Energy    Lepantosteven Pang [employer] Needs: BMR : 1551   1-2# loss weekly sedentary:  861 - 1361             1-2# loss  weekly lightly active:1133 - 1633  Maintenance calories for sedentary activity level: 1861  Protein:65 - 82      (1.2-1.5g/kg IBW)  Fluid: 64     (35mL/kg IBW)    Nutrition Diagnosis  Yes;    Overweight/obesity  related to Excess energy intake as evidenced by  BMI more than normative standard for age and sex (obesity-grade II 35-39.9)       Nutrition Intervention    Nutrition Prescription  Calories:1200 - 1500  Protein:65 - 82  Fluid:64    Meal Plan (Denis/Pro/Carb)  Breakfast: 200/27/10  (meal replacement)  Snack:160/15/12  Lunch:200/27/10  (meal replacement)  Snack:100/10/-  (low carb snack)  Dinner: 400/35/20  Snack:    Nutrition Education:    Calorie controlled menu  Lean protein food choices  Healthy snack options  Food journaling tips      Nutrition Counseling:  Strategies: meal planning, portion sizes, healthy snack choices, hydration, fiber intake, protein intake, exercise, food journal      Monitoring and Evaluation:  Evaluation criteria:  Energy Intake  Meet protein needs  Maintain adequate hydration  Monitor weekly weight  Meal planning/preparation  Food journal   Decreased portions at mealtimes and snacks  Physical activity     Barriers to learning:none  Readiness to change: Action:  (Changing behavior)  Comprehension: good  Expected Compliance: good

## 2024-03-04 DIAGNOSIS — E66.01 CLASS 2 SEVERE OBESITY DUE TO EXCESS CALORIES WITH SERIOUS COMORBIDITY AND BODY MASS INDEX (BMI) OF 35.0 TO 35.9 IN ADULT: ICD-10-CM

## 2024-03-04 RX ORDER — BUPROPION HYDROCHLORIDE 100 MG/1
100 TABLET ORAL 2 TIMES DAILY
Qty: 180 TABLET | Refills: 0 | Status: SHIPPED | OUTPATIENT
Start: 2024-03-04

## 2024-03-14 ENCOUNTER — TELEPHONE (OUTPATIENT)
Age: 49
End: 2024-03-14

## 2024-03-28 DIAGNOSIS — M47.819 SPONDYLOARTHROPATHY: Primary | ICD-10-CM

## 2024-03-28 RX ORDER — DIPHENHYDRAMINE HCL 25 MG
25 TABLET ORAL ONCE
OUTPATIENT
Start: 2024-04-01

## 2024-03-28 RX ORDER — SODIUM CHLORIDE 9 MG/ML
20 INJECTION, SOLUTION INTRAVENOUS ONCE
OUTPATIENT
Start: 2024-04-01

## 2024-03-28 RX ORDER — METHYLPREDNISOLONE SODIUM SUCCINATE 40 MG/ML
40 INJECTION, POWDER, LYOPHILIZED, FOR SOLUTION INTRAMUSCULAR; INTRAVENOUS ONCE
OUTPATIENT
Start: 2024-04-01

## 2024-03-28 RX ORDER — ACETAMINOPHEN 325 MG/1
650 TABLET ORAL ONCE
OUTPATIENT
Start: 2024-04-01

## 2024-04-01 ENCOUNTER — HOSPITAL ENCOUNTER (OUTPATIENT)
Dept: INFUSION CENTER | Facility: CLINIC | Age: 49
Discharge: HOME | End: 2024-04-01

## 2024-04-08 PROBLEM — Z48.815 ENCOUNTER FOR SURGICAL AFTERCARE FOLLOWING SURGERY OF DIGESTIVE SYSTEM: Status: ACTIVE | Noted: 2024-04-08

## 2024-04-10 DIAGNOSIS — M47.819 SPONDYLOARTHROPATHY: Primary | ICD-10-CM

## 2024-04-10 RX ORDER — ACETAMINOPHEN 325 MG/1
650 TABLET ORAL ONCE
Status: CANCELLED | OUTPATIENT
Start: 2024-04-12

## 2024-04-10 RX ORDER — METHYLPREDNISOLONE SODIUM SUCCINATE 40 MG/ML
40 INJECTION, POWDER, LYOPHILIZED, FOR SOLUTION INTRAMUSCULAR; INTRAVENOUS ONCE
Status: CANCELLED | OUTPATIENT
Start: 2024-04-12

## 2024-04-10 RX ORDER — SODIUM CHLORIDE 9 MG/ML
20 INJECTION, SOLUTION INTRAVENOUS ONCE
Status: CANCELLED | OUTPATIENT
Start: 2024-04-12

## 2024-04-10 RX ORDER — DIPHENHYDRAMINE HCL 25 MG
25 TABLET ORAL ONCE
Status: CANCELLED | OUTPATIENT
Start: 2024-04-12

## 2024-04-12 ENCOUNTER — HOSPITAL ENCOUNTER (OUTPATIENT)
Dept: INFUSION CENTER | Facility: CLINIC | Age: 49
End: 2024-04-12
Payer: COMMERCIAL

## 2024-04-12 VITALS
RESPIRATION RATE: 18 BRPM | WEIGHT: 198.8 LBS | DIASTOLIC BLOOD PRESSURE: 64 MMHG | TEMPERATURE: 96.7 F | SYSTOLIC BLOOD PRESSURE: 129 MMHG | BODY MASS INDEX: 34.12 KG/M2 | HEART RATE: 93 BPM

## 2024-04-12 DIAGNOSIS — M47.819 SPONDYLOARTHROPATHY: Primary | ICD-10-CM

## 2024-04-12 PROCEDURE — 96415 CHEMO IV INFUSION ADDL HR: CPT

## 2024-04-12 PROCEDURE — 96413 CHEMO IV INFUSION 1 HR: CPT

## 2024-04-12 PROCEDURE — 96375 TX/PRO/DX INJ NEW DRUG ADDON: CPT

## 2024-04-12 RX ORDER — ACETAMINOPHEN 325 MG/1
650 TABLET ORAL ONCE
Status: COMPLETED | OUTPATIENT
Start: 2024-04-12 | End: 2024-04-12

## 2024-04-12 RX ORDER — SODIUM CHLORIDE 9 MG/ML
20 INJECTION, SOLUTION INTRAVENOUS ONCE
Status: COMPLETED | OUTPATIENT
Start: 2024-04-12 | End: 2024-04-12

## 2024-04-12 RX ORDER — DIPHENHYDRAMINE HCL 25 MG
25 TABLET ORAL ONCE
Status: COMPLETED | OUTPATIENT
Start: 2024-04-12 | End: 2024-04-12

## 2024-04-12 RX ORDER — METHYLPREDNISOLONE SODIUM SUCCINATE 40 MG/ML
40 INJECTION, POWDER, LYOPHILIZED, FOR SOLUTION INTRAMUSCULAR; INTRAVENOUS ONCE
OUTPATIENT
Start: 2024-05-24

## 2024-04-12 RX ORDER — SODIUM CHLORIDE 9 MG/ML
20 INJECTION, SOLUTION INTRAVENOUS ONCE
OUTPATIENT
Start: 2024-05-24

## 2024-04-12 RX ORDER — METHYLPREDNISOLONE SODIUM SUCCINATE 40 MG/ML
40 INJECTION, POWDER, LYOPHILIZED, FOR SOLUTION INTRAMUSCULAR; INTRAVENOUS ONCE
Status: COMPLETED | OUTPATIENT
Start: 2024-04-12 | End: 2024-04-12

## 2024-04-12 RX ORDER — ACETAMINOPHEN 325 MG/1
650 TABLET ORAL ONCE
OUTPATIENT
Start: 2024-05-24

## 2024-04-12 RX ORDER — DIPHENHYDRAMINE HCL 25 MG
25 TABLET ORAL ONCE
OUTPATIENT
Start: 2024-05-24

## 2024-04-12 RX ADMIN — SODIUM CHLORIDE 20 ML/HR: 0.9 INJECTION, SOLUTION INTRAVENOUS at 12:50

## 2024-04-12 RX ADMIN — ACETAMINOPHEN 650 MG: 325 TABLET, FILM COATED ORAL at 12:50

## 2024-04-12 RX ADMIN — DIPHENHYDRAMINE HYDROCHLORIDE 25 MG: 25 TABLET ORAL at 12:50

## 2024-04-12 RX ADMIN — METHYLPREDNISOLONE SODIUM SUCCINATE 40 MG: 40 INJECTION, POWDER, FOR SOLUTION INTRAMUSCULAR; INTRAVENOUS at 12:50

## 2024-04-12 RX ADMIN — INFLIXIMAB 473 MG: 100 INJECTION, POWDER, LYOPHILIZED, FOR SOLUTION INTRAVENOUS at 13:29

## 2024-04-12 NOTE — PROGRESS NOTES
Pt to clinic for Remicade. Pt offers no complaints. Pt tolerated infusion without complications. PIV removed. Pt aware of next appointment on 5/24/24 at 1300. AVS declined.

## 2024-05-24 ENCOUNTER — HOSPITAL ENCOUNTER (OUTPATIENT)
Dept: INFUSION CENTER | Facility: CLINIC | Age: 49
End: 2024-05-24

## 2024-06-06 DIAGNOSIS — M47.819 SPONDYLOARTHROPATHY: Primary | ICD-10-CM

## 2024-06-06 RX ORDER — DIPHENHYDRAMINE HCL 25 MG
25 TABLET ORAL ONCE
OUTPATIENT
Start: 2024-06-10

## 2024-06-06 RX ORDER — SODIUM CHLORIDE 9 MG/ML
20 INJECTION, SOLUTION INTRAVENOUS ONCE
OUTPATIENT
Start: 2024-06-10

## 2024-06-06 RX ORDER — METHYLPREDNISOLONE SODIUM SUCCINATE 40 MG/ML
40 INJECTION, POWDER, LYOPHILIZED, FOR SOLUTION INTRAMUSCULAR; INTRAVENOUS ONCE
OUTPATIENT
Start: 2024-06-10

## 2024-06-06 RX ORDER — ACETAMINOPHEN 325 MG/1
650 TABLET ORAL ONCE
OUTPATIENT
Start: 2024-06-10

## 2024-06-10 ENCOUNTER — HOSPITAL ENCOUNTER (OUTPATIENT)
Dept: INFUSION CENTER | Facility: CLINIC | Age: 49
Discharge: HOME | End: 2024-06-10
Payer: COMMERCIAL

## 2024-06-10 VITALS
WEIGHT: 197 LBS | HEART RATE: 86 BPM | BODY MASS INDEX: 33.81 KG/M2 | SYSTOLIC BLOOD PRESSURE: 132 MMHG | DIASTOLIC BLOOD PRESSURE: 75 MMHG | RESPIRATION RATE: 18 BRPM | TEMPERATURE: 96.9 F

## 2024-06-10 DIAGNOSIS — M47.819 SPONDYLOARTHROPATHY: Primary | ICD-10-CM

## 2024-06-10 RX ORDER — DIPHENHYDRAMINE HCL 25 MG
25 TABLET ORAL ONCE
OUTPATIENT
Start: 2024-07-22

## 2024-06-10 RX ORDER — SODIUM CHLORIDE 9 MG/ML
20 INJECTION, SOLUTION INTRAVENOUS ONCE
Status: COMPLETED | OUTPATIENT
Start: 2024-06-10 | End: 2024-06-10

## 2024-06-10 RX ORDER — SODIUM CHLORIDE 9 MG/ML
20 INJECTION, SOLUTION INTRAVENOUS ONCE
OUTPATIENT
Start: 2024-07-22

## 2024-06-10 RX ORDER — METHYLPREDNISOLONE SODIUM SUCCINATE 40 MG/ML
40 INJECTION, POWDER, LYOPHILIZED, FOR SOLUTION INTRAMUSCULAR; INTRAVENOUS ONCE
Status: COMPLETED | OUTPATIENT
Start: 2024-06-10 | End: 2024-06-10

## 2024-06-10 RX ORDER — ACETAMINOPHEN 325 MG/1
650 TABLET ORAL ONCE
Status: COMPLETED | OUTPATIENT
Start: 2024-06-10 | End: 2024-06-10

## 2024-06-10 RX ORDER — DIPHENHYDRAMINE HCL 25 MG
25 TABLET ORAL ONCE
Status: COMPLETED | OUTPATIENT
Start: 2024-06-10 | End: 2024-06-10

## 2024-06-10 RX ORDER — ACETAMINOPHEN 325 MG/1
650 TABLET ORAL ONCE
OUTPATIENT
Start: 2024-07-22

## 2024-06-10 RX ORDER — METHYLPREDNISOLONE SODIUM SUCCINATE 40 MG/ML
40 INJECTION, POWDER, LYOPHILIZED, FOR SOLUTION INTRAMUSCULAR; INTRAVENOUS ONCE
OUTPATIENT
Start: 2024-07-22

## 2024-06-10 RX ADMIN — METHYLPREDNISOLONE SODIUM SUCCINATE 40 MG: 40 INJECTION, POWDER, FOR SOLUTION INTRAMUSCULAR; INTRAVENOUS at 10:35

## 2024-06-10 RX ADMIN — DIPHENHYDRAMINE HYDROCHLORIDE 25 MG: 25 TABLET ORAL at 10:35

## 2024-06-10 RX ADMIN — SODIUM CHLORIDE 20 ML/HR: 0.9 INJECTION, SOLUTION INTRAVENOUS at 10:32

## 2024-06-10 RX ADMIN — INFLIXIMAB 451 MG: 100 INJECTION, POWDER, LYOPHILIZED, FOR SOLUTION INTRAVENOUS at 11:12

## 2024-06-10 RX ADMIN — ACETAMINOPHEN 650 MG: 325 TABLET, FILM COATED ORAL at 10:35

## 2024-06-10 NOTE — PROGRESS NOTES
Marcella Avitia  tolerated treatment well with no complications.      Marcella Avitia is aware of future appt on 7/23 at 0730.     AVS printed and given to Marcella Avitia:  Yes d/c from unit stable

## 2024-07-25 ENCOUNTER — TELEPHONE (OUTPATIENT)
Dept: RHEUMATOLOGY | Facility: CLINIC | Age: 49
End: 2024-07-25

## 2024-07-25 NOTE — TELEPHONE ENCOUNTER
Called and spoke to Donald at [facility] Number [phone].    Donald stated that  does require review. Tried to initiate authorization but when given Veliz NPI of [id number] and Tax ID of [id number] it was coming up with [facility] address not [facility]. Did not move forward with authorization as I wanted to double check with the team first.     Please advise if okay to proceed with authorization at this time.

## 2024-07-25 NOTE — TELEPHONE ENCOUNTER
----- Message from [place] RAMON sent at 7/24/2024  3:17 PM EDT -----  Regarding: Prior Auth needed  Good Afternoon,       Marcella will need a new prior auth for Remicade prior to her 8.05 infusion at [place].   Please use NPI/TaxId for [facility]-         Thankyou :)         J[place] RAMON

## 2024-08-01 DIAGNOSIS — M47.819 SPONDYLOARTHROPATHY: Primary | ICD-10-CM

## 2024-08-01 RX ORDER — SODIUM CHLORIDE 9 MG/ML
20 INJECTION, SOLUTION INTRAVENOUS ONCE
OUTPATIENT
Start: 2024-08-05

## 2024-08-01 RX ORDER — METHYLPREDNISOLONE SODIUM SUCCINATE 40 MG/ML
40 INJECTION, POWDER, LYOPHILIZED, FOR SOLUTION INTRAMUSCULAR; INTRAVENOUS ONCE
OUTPATIENT
Start: 2024-08-05

## 2024-08-01 RX ORDER — ACETAMINOPHEN 325 MG/1
650 TABLET ORAL ONCE
OUTPATIENT
Start: 2024-08-05

## 2024-08-01 RX ORDER — DIPHENHYDRAMINE HCL 25 MG
25 TABLET ORAL ONCE
OUTPATIENT
Start: 2024-08-05

## 2024-08-05 ENCOUNTER — HOSPITAL ENCOUNTER (OUTPATIENT)
Dept: INFUSION CENTER | Facility: CLINIC | Age: 49
Discharge: HOME | End: 2024-08-05
Payer: COMMERCIAL

## 2024-08-05 VITALS
RESPIRATION RATE: 18 BRPM | BODY MASS INDEX: 33.54 KG/M2 | TEMPERATURE: 98.4 F | SYSTOLIC BLOOD PRESSURE: 125 MMHG | DIASTOLIC BLOOD PRESSURE: 70 MMHG | WEIGHT: 195.4 LBS | OXYGEN SATURATION: 99 % | HEART RATE: 87 BPM

## 2024-08-05 DIAGNOSIS — M47.819 SPONDYLOARTHROPATHY: Primary | ICD-10-CM

## 2024-08-05 PROCEDURE — 96375 TX/PRO/DX INJ NEW DRUG ADDON: CPT

## 2024-08-05 PROCEDURE — 96413 CHEMO IV INFUSION 1 HR: CPT

## 2024-08-05 PROCEDURE — 96415 CHEMO IV INFUSION ADDL HR: CPT

## 2024-08-05 RX ORDER — ACETAMINOPHEN 325 MG/1
650 TABLET ORAL ONCE
Status: COMPLETED | OUTPATIENT
Start: 2024-08-05 | End: 2024-08-05

## 2024-08-05 RX ORDER — ACETAMINOPHEN 325 MG/1
650 TABLET ORAL ONCE
OUTPATIENT
Start: 2024-09-16

## 2024-08-05 RX ORDER — METHYLPREDNISOLONE SODIUM SUCCINATE 40 MG/ML
40 INJECTION, POWDER, LYOPHILIZED, FOR SOLUTION INTRAMUSCULAR; INTRAVENOUS ONCE
Status: COMPLETED | OUTPATIENT
Start: 2024-08-05 | End: 2024-08-05

## 2024-08-05 RX ORDER — SODIUM CHLORIDE 9 MG/ML
20 INJECTION, SOLUTION INTRAVENOUS ONCE
OUTPATIENT
Start: 2024-09-16

## 2024-08-05 RX ORDER — SODIUM CHLORIDE 9 MG/ML
20 INJECTION, SOLUTION INTRAVENOUS ONCE
Status: COMPLETED | OUTPATIENT
Start: 2024-08-05 | End: 2024-08-05

## 2024-08-05 RX ORDER — DIPHENHYDRAMINE HCL 25 MG
25 TABLET ORAL ONCE
Status: COMPLETED | OUTPATIENT
Start: 2024-08-05 | End: 2024-08-05

## 2024-08-05 RX ORDER — METHYLPREDNISOLONE SODIUM SUCCINATE 40 MG/ML
40 INJECTION, POWDER, LYOPHILIZED, FOR SOLUTION INTRAMUSCULAR; INTRAVENOUS ONCE
OUTPATIENT
Start: 2024-09-16

## 2024-08-05 RX ORDER — DIPHENHYDRAMINE HCL 25 MG
25 TABLET ORAL ONCE
OUTPATIENT
Start: 2024-09-16

## 2024-08-05 RX ADMIN — METHYLPREDNISOLONE SODIUM SUCCINATE 40 MG: 40 INJECTION, POWDER, FOR SOLUTION INTRAMUSCULAR; INTRAVENOUS at 08:32

## 2024-08-05 RX ADMIN — INFLIXIMAB 447 MG: 100 INJECTION, POWDER, LYOPHILIZED, FOR SOLUTION INTRAVENOUS at 09:12

## 2024-08-05 RX ADMIN — SODIUM CHLORIDE 20 ML/HR: 0.9 INJECTION, SOLUTION INTRAVENOUS at 08:29

## 2024-08-05 RX ADMIN — DIPHENHYDRAMINE HYDROCHLORIDE 25 MG: 25 TABLET ORAL at 08:32

## 2024-08-05 RX ADMIN — ACETAMINOPHEN 650 MG: 325 TABLET, FILM COATED ORAL at 08:32

## 2024-08-05 NOTE — PROGRESS NOTES
Pt into clinic for Remicade. Pt offers no complaints and denies recent infection and abx.     Tolerated infusion. PIV removed.     Pt aware of next appointment on 9/16/24 at 11am. AVS printed and received.

## 2024-09-08 NOTE — PROGRESS NOTES
Assessment and Plan:   Ms. 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  She is currently receiving Remicade infusions 5 mg/kg every 6 weeks that was started in 1/2023.    Debbie Ankylosing spondylitis  Urvashi Naranjo presents today for a follow-up of ankylosing spondylitis for which she is currently on Remicade infusions 5 mg/kg every 6 weeks.  She has been fairly stable on this regimen but does notice a decline in her symptoms towards the end of the 6-week cycle.  With the last 2 infusions she has also been noticing palpitations for a few days after receiving the medication.  An echocardiogram done by her PCP was normal.  I requested she follow-up with them to also obtain an EKG and we may need to consider a referral to cardiology or obtaining a Holter monitor.  Her next infusion is coming up on 9/16 and I requested she make me aware if the palpitations are to recur.  In this case she would like to try spacing the infusions out to every 8 weeks.  If cardiac issues persist then I may need to discontinue the Remicade.  She is agreeable to pursuing this plan.  She is due to update high risk medication lab monitoring to assess for drug toxicities.    - In regards to being on immunosuppressants I advised her that there is no absolute contraindication to pursuing dental work and she may proceed with the dental implants as per the discretion of her dentist.  She is aware being on immunosuppression can potentially delay healing or increase the risk for infections.    - Given her diagnosis I will also provide her with a work accommodation letter so she can work remotely.  A handicap placard will also be provided.      Plan:  Diagnoses and all orders for this visit:    Ankylosing spondylitis of sacral region (HCC)  -     C-reactive protein; Future  -     Sedimentation rate, automated; Future    HLA B27  positive    Long-term use of immunosuppressant medication  -     CBC and differential; Future  -     Comprehensive metabolic panel; Future    Screening-pulmonary TB  -     Quantiferon TB Gold Plus Assay; Future      Activities as tolerated.   Exercise: try to maintain a low impact exercise regimen as much as possible. Walk for 30 minutes a day for at least 3 days a week.   Continue other medications as prescribed by PCP and other specialists.       RTC in 6 months.        HPI     INITIAL VISIT NOTE (3/2022):  Ms. Avitia is a 47-year-old female with history significant for a possible peripheral spondyloarthropathy who presents to establish with [facility].  She was most recently on subcutaneous Humira 40 mg every 2 weeks.  She is transferring care from [facility] in [place].  She is referred today by Dr. King for a rheumatology consult.    I do not have records available for review from her prior rheumatologist.  Patient reports she was diagnosed with a possible spondyloarthropathy approximately 1 year ago and was following with a rheumatologist in [place].  This was diagnosed based on episodes of joint pain and swelling.  She mentions she was initially on oral medications which were effective for her but they were discontinued due to side effects such as GI upset.  She is unable to recall any of the names.  She was then switched to subcutaneous Humira 40 mg every 2 weeks in June 2021 and was on this up until November 2021.  She has not had follow-up with her rheumatologist since then so the medication was not refilled.  She reports while on the Humira it did help with her symptoms of the joint pain and swelling but she would feel unwell as a result of the injection.  Since then she has been managed with short courses of steroids as needed prescribed by her primary care physician.  She is currently completing a course and is on 10 mg once daily.  She reports while on the steroids she  is completely asymptomatic.    The joints that continue to bother her the most include her hands, elbows, feet as well as muscle spasms in her left lower back.  At times she will still notice swelling of her feet.  She denies sausage digits and states that her entire foot will swell.  She experiences diffuse morning stiffness which improves quickly with activities.  She has tried all the over-the-counter pain medications without any relief.    She denies fevers, unintentional weight loss, inflammatory eye disease, skin rash, psoriasis, inflammatory bowel disease or a family history of autoimmune disease.      She did have recent blood work done by her primary care physician which showed an elevated ESR and high sensitivity C-reactive protein of 59 and 25.7, respectively.  An STEVE screen, rheumatoid factor, anti CCP antibody and hepatitis-C antibody were normal.      12/12/2022:  Patient presents for a follow-up today.  I reviewed the work-up done after the last office visit with the x-ray of the sacroiliac joints showing mildly increased bilateral sacroiliac joint space with sclerosis concerning for sacroiliitis.  The blood work showed a positive HLA-B27 antigen.  Sjogren's antibodies and a QuantiFERON-TB gold were normal.    Patient reports she is primarily experiencing pain at this time in her neck, upper back and shoulder region along with a flulike sensation but without fevers.  She is generally feeling unwell and is also describing widespread body aches with joint and muscle pain as well as fatigue.    After receiving her results in March 2022 we did try to contact her to restart adalimumab but she did not get back to the office.      5/8/2023:  Patient presents for a follow-up of ankylosing spondylitis.  She is currently receiving Remicade infusions 5 mg/kg every 8 weeks that was started after the last visit.    She reports with the Remicade infusions she has noticed some improvement in her fatigue as well as  the back pain although it is still present.  She reports the flulike sensation seems to have resolved.  She mentions for a few days after her infusions she does get upper respiratory tract infection type symptoms but this resolves spontaneously.  She has not had to go on antibiotics.  She has managed to discontinue daily NSAID use and takes Advil only on an as-needed basis which does help.  No recent steroids for joint related issues.      12/4/2023:  Patient presents for a follow-up of ankylosing spondylitis.  She is currently receiving Remicade infusions 5 mg/kg every 6 weeks.  I reviewed her labs done after the last visit which showed an elevated C-reactive protein of 12.  A CBC, CMP and ESR were unremarkable.    She reports overall with initiation of Remicade and changing the frequency she definitely feels like it helps with her overall joint pains and swelling.  At times she will still notice puffiness in her knees and also describes chronic low back pain as well as pain in her knees and ankles.  She has also been appreciating pain affecting her right shoulder along with restricted range of motion.  She requires the help of her left arm to raise her right arm.  Pain is also noted in her right mid back region with occasional spasms.    She has overall been tolerating the Remicade infusions fairly well but reports in the first 2 weeks after the medication she feels she is more prone to infections if she has exposure with sick contacts.        9/9/2024:  Patient presents for a follow-up of ankylosing spondylitis.  She is currently receiving Remicade infusions 5 mg/kg every 6 weeks.     She has overall been stable from an arthritis perspective.  She does notice a slight decline in her symptoms towards the end of the 6-week cycle, but nothing significant.  She mentions with the last 2 cycles for a few days following the infusion she did notice palpitations.  She was seen by her primary care physician and had an  echocardiogram done which she tells me was normal.  An EKG was not done.  No associated symptoms of chest pain or shortness of breath.  She mentions that she did not experience these symptoms when she was receiving the Remicade at 8-week intervals.    The following portions of the patient's history were reviewed and updated as appropriate: allergies, current medications, past family history, past medical history, past social history, past surgical history and problem list.      Review of Systems  Constitutional: Negative for weight change, fevers, chills, night sweats.  Positive for fatigue.  ENT/Mouth: Negative for hearing changes, ear pain, nasal congestion, sinus pain, hoarseness, sore throat, rhinorrhea, swallowing difficulty.   Eyes: Negative for pain, redness, discharge, vision changes.   Cardiovascular: Negative for chest pain, SOB, palpitations currently.   Respiratory: Negative for cough, sputum, wheezing, dyspnea.   Gastrointestinal: Negative for nausea, vomiting, diarrhea, constipation, pain, heartburn.  Genitourinary: Negative for dysuria, urinary frequency, hematuria.   Musculoskeletal: As per HPI.  Skin: Negative for skin rash, color changes.   Neuro: Negative for weakness, numbness, tingling, loss of consciousness.   Psych: Negative for anxiety, depression.   Heme/Lymph: Negative for easy bruising, bleeding, lymphadenopathy.        Past Medical History:   Diagnosis Date    Ankylosing spondylitis of sacral region (HCC)          Past Surgical History:   Procedure Laterality Date    STOMACH SURGERY      TONSILLECTOMY      TUBAL LIGATION         Social History     Socioeconomic History    Marital status: /Civil Union     Spouse name: Not on file    Number of children: Not on file    Years of education: Not on file    Highest education level: Not on file   Occupational History    Not on file   Tobacco Use    Smoking status: Some Days     Types: Cigarettes    Smokeless tobacco: Former    Tobacco  comments:     Only when drinking wine    Vaping Use    Vaping status: Never Used   Substance and Sexual Activity    Alcohol use: Yes    Drug use: Not Currently    Sexual activity: Yes     Partners: Male   Other Topics Concern    Not on file   Social History Narrative    Not on file     Social Determinants of Health     Financial Resource Strain: High Risk (9/20/2023)    [facility]    Overall Financial Resource Strain (CARDIA)     Difficulty of Paying Living Expenses: Hard   Food Insecurity: Food Insecurity Present (9/20/2023)    [facility]    Hunger Vital Sign     Worried About Running Out of Food in the Last Year: Sometimes true     Ran Out of Food in the Last Year: Never true   Transportation Needs: No Transportation Needs (9/20/2023)    [facility]    PRAPARE - Transportation     Lack of Transportation (Medical): No     Lack of Transportation (Non-Medical): No   Physical Activity: Inactive (9/20/2023)    Received from [facility]    Exercise Vital Sign     Days of Exercise per Week: 0 days     Minutes of Exercise per Session: 0 min   Stress: Stress Concern Present (9/20/2023)    [facility]    [place] [employer] - Occupational Stress Questionnaire     Feeling of Stress : Very much   Social Connections: Socially Isolated (9/20/2023)    [facility]    Social Connection and Isolation Panel [NHANES]     Frequency of Communication with Friends and Family: Twice a week     Frequency of Social Gatherings with Friends and Family: Never     Attends Jew Services: Never     Active Member of Clubs or Organizations: No     Attends Club or Organization Meetings: Never     Marital  Status:    Intimate Partner Violence: Unknown (9/20/2023)    [facility]    Humiliation, Afraid, Rape, and Kick questionnaire     Fear of Current or Ex-Partner: No     Emotionally Abused: No     Physically Abused: No     Sexually Abused: Not on file   Housing Stability: High Risk (9/20/2023)    [facility]    Housing Stability Vital Sign     Unable to Pay for Housing in the Last Year: Yes     Number of Places Lived in the Last Year: 1     Unstable Housing in the Last Year: No       Family History   Problem Relation Age of Onset    Breast cancer Mother         Masectomy    Breast cancer Maternal Aunt         Ymph node removal       No Known Allergies      Current Outpatient Medications:     albuterol (PROVENTIL HFA,VENTOLIN HFA) 90 mcg/act inhaler, INHALE 2 PUFFS EVERY 6 HOURS AS NEEDED FOR WHEEZING, Disp: 6.7 g, Rfl: 0    atorvastatin (LIPITOR) 40 mg tablet, TAKE 1 TABLET BY MOUTH EVERY DAY FOR 30 DAYS, Disp: , Rfl:     buPROPion (WELLBUTRIN) 100 mg tablet, TAKE 1 TABLET BY MOUTH TWICE A DAY, Disp: 180 tablet, Rfl: 0    Ibuprofen (Advil) 200 MG CAPS, Take 1 capsule by mouth in the morning, Disp: , Rfl:     losartan (COZAAR) 50 mg tablet, Take 50 mg by mouth daily, Disp: , Rfl:     naltrexone (REVIA) 50 mg tablet, Take 1/2 tablet (25mg) once daily in the morning then after 1 week increase to 1/2 tablet (25mg) twice daily in the morning and evening, Disp: 30 tablet, Rfl: 1    Trelegy Ellipta 100-62.5-25 MCG/ACT inhaler, Inhale 1 puff daily, Disp: , Rfl:     amoxicillin (AMOXIL) 875 mg tablet, Take 875 mg by mouth 2 (two) times a day (Patient not taking: Reported on 9/9/2024), Disp: , Rfl:     budesonide-formoterol (Symbicort) 160-4.5 mcg/act inhaler, Inhale 2 puffs 2 (two) times a day Symbicort Brand medically necessary (Patient not taking: Reported on 2/19/2024), Disp: 10.2 g, Rfl: 11     cetirizine (ZyrTEC) 10 mg tablet, Take 10 mg by mouth daily (Patient not taking: Reported on 2/19/2024), Disp: , Rfl:     cyclobenzaprine (FLEXERIL) 10 mg tablet, Take 1 tablet (10 mg total) by mouth 2 (two) times a day as needed for muscle spasms (Patient not taking: Reported on 2/19/2024), Disp: 20 tablet, Rfl: 0    INFLIXIMAB IV, Inject into a catheter in a vein (Patient not taking: Reported on 2/19/2024), Disp: , Rfl:     montelukast (SINGULAIR) 10 mg tablet, TAKE 1 TABLET DAILY AT BEDTIME (Patient not taking: Reported on 2/19/2024), Disp: 90 tablet, Rfl: 3      Objective:    Vitals:    09/09/24 1118   BP: 118/86   BP Location: Left arm   Patient Position: Sitting   Cuff Size: Standard   Pulse: 76   SpO2: 98%   Weight: 88.5 kg (195 lb)       Physical Exam  General: Well appearing, well nourished, in no distress. Oriented x 3, normal mood and affect.  Ambulating without difficulty.  Skin: Good turgor, no rash, unusual bruising or prominent lesions.  Hair: Normal texture and distribution.  Nails: Normal color, no deformities.  HEENT:  Head: Normocephalic, atraumatic.  Eyes: Conjunctiva clear, sclera non-icteric, EOM intact.  Extremities: No amputations or deformities, cyanosis, edema.  Neurologic: Alert and oriented. No focal neurological deficits appreciated.   Psychiatric: Normal mood and affect.       Hawa Fagan M.D.  Rheumatology

## 2024-09-09 ENCOUNTER — OFFICE VISIT (OUTPATIENT)
Dept: RHEUMATOLOGY | Facility: CLINIC | Age: 49
End: 2024-09-09
Payer: COMMERCIAL

## 2024-09-09 ENCOUNTER — TELEPHONE (OUTPATIENT)
Dept: RHEUMATOLOGY | Facility: CLINIC | Age: 49
End: 2024-09-09

## 2024-09-09 VITALS
DIASTOLIC BLOOD PRESSURE: 86 MMHG | WEIGHT: 195 LBS | SYSTOLIC BLOOD PRESSURE: 118 MMHG | OXYGEN SATURATION: 98 % | HEART RATE: 76 BPM | BODY MASS INDEX: 33.47 KG/M2

## 2024-09-09 DIAGNOSIS — M45.8 ANKYLOSING SPONDYLITIS OF SACRAL REGION (HCC): Primary | ICD-10-CM

## 2024-09-09 DIAGNOSIS — Z11.1 SCREENING-PULMONARY TB: ICD-10-CM

## 2024-09-09 DIAGNOSIS — Z15.89 HLA B27 POSITIVE: ICD-10-CM

## 2024-09-09 DIAGNOSIS — Z79.60 LONG-TERM USE OF IMMUNOSUPPRESSANT MEDICATION: ICD-10-CM

## 2024-09-09 PROCEDURE — 99215 OFFICE O/P EST HI 40 MIN: CPT | Performed by: INTERNAL MEDICINE

## 2024-09-09 NOTE — TELEPHONE ENCOUNTER
The letter scanned under media from 4/10/2024 needs to be updated with today's date and to give her accommodations for 1 year.  Once this letter is complete then please mail, fax or MyChart the patient as per what she prefers.

## 2024-09-09 NOTE — TELEPHONE ENCOUNTER
Spoke to patient over phone and confirmed the requested documents are to be sent through [employer], document have been sent

## 2024-09-11 ENCOUNTER — TELEPHONE (OUTPATIENT)
Dept: RHEUMATOLOGY | Facility: CLINIC | Age: 49
End: 2024-09-11

## 2024-09-17 ENCOUNTER — TELEPHONE (OUTPATIENT)
Dept: INFUSION CENTER | Facility: CLINIC | Age: 49
End: 2024-09-17

## 2024-09-17 NOTE — TELEPHONE ENCOUNTER
Patient has no showed 2 times within a 6 month period for her infusion appts.  Pt no showed on 7/23/24 and 9/16/24.  Infusion techs please contact patient and review the no show policy.  Please also let her no that she has no more future appts scheduled and will need to schedule her next appt since she missed yesterdays.   WDL

## 2024-09-18 NOTE — TELEPHONE ENCOUNTER
Spoke w/ pt and she stated that she will call back to reschedule once she sees her doctor because they may change her plan.  She is aware of No Show Policy and current No Shows

## 2024-10-20 ENCOUNTER — TELEPHONE (OUTPATIENT)
Dept: RHEUMATOLOGY | Facility: CLINIC | Age: 49
End: 2024-10-20

## 2024-10-20 DIAGNOSIS — M45.8 ANKYLOSING SPONDYLITIS OF SACRAL REGION (HCC): Primary | ICD-10-CM

## 2024-10-20 DIAGNOSIS — Z11.1 SCREENING-PULMONARY TB: ICD-10-CM

## 2024-10-20 NOTE — TELEPHONE ENCOUNTER
Please let her know one of the inflammation markers is elevated more than her baseline (the C-reactive protein). I would like her to repeat this again in 1 month and will place new orders.    The TB test was also unable to be done by the lab, so we will need to redo this, I will place a new order.

## 2025-01-11 NOTE — TELEPHONE ENCOUNTER
Left message on offices voicemail
Please try and obtain records from prior rheumatologist - [address] in [facility] (Dr Lia Marshall) 
<-- Click to add NO pertinent Past Medical History

## 2025-03-13 ENCOUNTER — TELEPHONE (OUTPATIENT)
Dept: RHEUMATOLOGY | Facility: CLINIC | Age: 50
End: 2025-03-13

## 2025-03-13 ENCOUNTER — TELEMEDICINE (OUTPATIENT)
Dept: RHEUMATOLOGY | Facility: CLINIC | Age: 50
End: 2025-03-13
Payer: COMMERCIAL

## 2025-03-13 DIAGNOSIS — Z15.89 HLA B27 POSITIVE: ICD-10-CM

## 2025-03-13 DIAGNOSIS — Z79.60 LONG-TERM USE OF IMMUNOSUPPRESSANT MEDICATION: ICD-10-CM

## 2025-03-13 DIAGNOSIS — M45.8 ANKYLOSING SPONDYLITIS OF SACRAL REGION (HCC): Primary | ICD-10-CM

## 2025-03-13 DIAGNOSIS — Z11.1 SCREENING-PULMONARY TB: ICD-10-CM

## 2025-03-13 PROCEDURE — 99214 OFFICE O/P EST MOD 30 MIN: CPT | Performed by: INTERNAL MEDICINE

## 2025-03-13 NOTE — PROGRESS NOTES
Virtual Regular VisitName: Marcella Avitia      : 1975      MRN: [id number]  Encounter Provider: Hawa Fagan MD  Encounter Date: 3/13/2025   Encounter department: [facility]  :  Assessment & Plan  Ankylosing spondylitis of sacral region (HCC)  Ms. 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  She had been receiving Remicade infusions 5 mg/kg every 6 weeks that was started in 2023 but this has been on hold since .     # Ankylosing spondylitis  - Marcella presents today for a follow-up of ankylosing spondylitis for which she had been on Remicade infusions 5 mg/kg every 6 weeks but this has been on hold since  as she was reporting palpitations that we wanted evaluated by cardiology.  She will drop off their records for my review.  She is also getting dental implants on 3/18 and following this she will review with her cardiologist if it may be reasonable to resume the infusions.  If cardiology would prefer we do not do this, then I will discuss an alternate biologic DMARD with her.    Orders:    C-reactive protein; Future    Sedimentation rate, automated; Future    HLA B27 positive         Long-term use of immunosuppressant medication    Orders:    CBC and differential; Future    Comprehensive metabolic panel; Future    Screening-pulmonary TB    Orders:    Quantiferon TB Gold Plus Assay; Future    Quantiferon-TB Gold Plus, 4 tubes, Draw Site Incubated; Future    Ankylosing spondylitis of sacral region (HCC)         HLA B27 positive         Long-term use of immunosuppressant medication           Patient's rheumatologic disease(s) threaten long-term function if not appropriately managed.      History of Present Illness     HPI    INITIAL VISIT NOTE (3/2022):  Ms. Avitia is a 47-year-old female with history significant for a possible peripheral  spondyloarthropathy who presents to establish with [facility].  She was most recently on subcutaneous Humira 40 mg every 2 weeks.  She is transferring care from [facility] in [place].  She is referred today by Dr. King for a rheumatology consult.     I do not have records available for review from her prior rheumatologist.  Patient reports she was diagnosed with a possible spondyloarthropathy approximately 1 year ago and was following with a rheumatologist in [place].  This was diagnosed based on episodes of joint pain and swelling.  She mentions she was initially on oral medications which were effective for her but they were discontinued due to side effects such as GI upset.  She is unable to recall any of the names.  She was then switched to subcutaneous Humira 40 mg every 2 weeks in June 2021 and was on this up until November 2021.  She has not had follow-up with her rheumatologist since then so the medication was not refilled.  She reports while on the Humira it did help with her symptoms of the joint pain and swelling but she would feel unwell as a result of the injection.  Since then she has been managed with short courses of steroids as needed prescribed by her primary care physician.  She is currently completing a course and is on 10 mg once daily.  She reports while on the steroids she is completely asymptomatic.     The joints that continue to bother her the most include her hands, elbows, feet as well as muscle spasms in her left lower back.  At times she will still notice swelling of her feet.  She denies sausage digits and states that her entire foot will swell.  She experiences diffuse morning stiffness which improves quickly with activities.  She has tried all the over-the-counter pain medications without any relief.     She denies fevers, unintentional weight loss, inflammatory eye disease, skin rash, psoriasis, inflammatory bowel disease or a family history of  autoimmune disease.       She did have recent blood work done by her primary care physician which showed an elevated ESR and high sensitivity C-reactive protein of 59 and 25.7, respectively.  An STEVE screen, rheumatoid factor, anti CCP antibody and hepatitis-C antibody were normal.        12/12/2022:  Patient presents for a follow-up today.  I reviewed the work-up done after the last office visit with the x-ray of the sacroiliac joints showing mildly increased bilateral sacroiliac joint space with sclerosis concerning for sacroiliitis.  The blood work showed a positive HLA-B27 antigen.  Sjogren's antibodies and a QuantiFERON-TB gold were normal.     Patient reports she is primarily experiencing pain at this time in her neck, upper back and shoulder region along with a flulike sensation but without fevers.  She is generally feeling unwell and is also describing widespread body aches with joint and muscle pain as well as fatigue.     After receiving her results in March 2022 we did try to contact her to restart adalimumab but she did not get back to the office.        5/8/2023:  Patient presents for a follow-up of ankylosing spondylitis.  She is currently receiving Remicade infusions 5 mg/kg every 8 weeks that was started after the last visit.     She reports with the Remicade infusions she has noticed some improvement in her fatigue as well as the back pain although it is still present.  She reports the flulike sensation seems to have resolved.  She mentions for a few days after her infusions she does get upper respiratory tract infection type symptoms but this resolves spontaneously.  She has not had to go on antibiotics.  She has managed to discontinue daily NSAID use and takes Advil only on an as-needed basis which does help.  No recent steroids for joint related issues.        12/4/2023:  Patient presents for a follow-up of ankylosing spondylitis.  She is currently receiving Remicade infusions 5 mg/kg every 6  weeks.  I reviewed her labs done after the last visit which showed an elevated C-reactive protein of 12.  A CBC, CMP and ESR were unremarkable.     She reports overall with initiation of Remicade and changing the frequency she definitely feels like it helps with her overall joint pains and swelling.  At times she will still notice puffiness in her knees and also describes chronic low back pain as well as pain in her knees and ankles.  She has also been appreciating pain affecting her right shoulder along with restricted range of motion.  She requires the help of her left arm to raise her right arm.  Pain is also noted in her right mid back region with occasional spasms.     She has overall been tolerating the Remicade infusions fairly well but reports in the first 2 weeks after the medication she feels she is more prone to infections if she has exposure with sick contacts.          9/9/2024:  Patient presents for a follow-up of ankylosing spondylitis.  She is currently receiving Remicade infusions 5 mg/kg every 6 weeks.      She has overall been stable from an arthritis perspective.  She does notice a slight decline in her symptoms towards the end of the 6-week cycle, but nothing significant.  She mentions with the last 2 cycles for a few days following the infusion she did notice palpitations.  She was seen by her primary care physician and had an echocardiogram done which she tells me was normal.  An EKG was not done.  No associated symptoms of chest pain or shortness of breath.  She mentions that she did not experience these symptoms when she was receiving the Remicade at 8-week intervals.      3/13/2025:  Patient presents for a follow-up of ankylosing spondylitis.  She had been receiving Remicade infusions 5 mg/kg every 6 weeks, but this has been on hold since 8/24 [although she mentions she last got an infusion in November but I do not see it on chart review].    At our last appointment she had been  experiencing palpitations on the day of her infusions, so we discussed following up with cardiology for an evaluation.  She did see them and apparently had a Holter monitor placed, but I do not have any records available to review.  She will drop this off at the office.  She is unsure at this point if her cardiologist would be okay with her resuming the infusions.  They have also been on hold as she is undergoing dental implants this month and her dental procedures will be completed on 3/18.  Following this she will contact her cardiologist to discuss the next steps.  Reassuringly since being off the infusions there has been no significant flareup in her spinal or joint pains.      Review of Systems  Constitutional: Negative for weight change, fevers, chills, night sweats, fatigue.  ENT/Mouth: Negative for hearing changes, ear pain, nasal congestion, sinus pain, hoarseness, sore throat, rhinorrhea, swallowing difficulty.   Eyes: Negative for pain, redness, discharge, vision changes.   Cardiovascular: Negative for chest pain, SOB.   Respiratory: Negative for cough, sputum, wheezing, dyspnea.   Gastrointestinal: Negative for nausea, vomiting, diarrhea, constipation, pain, heartburn.  Genitourinary: Negative for dysuria, urinary frequency, hematuria.   Musculoskeletal: As per HPI.  Skin: Negative for skin rash, color changes.   Neuro: Negative for weakness, numbness, tingling, loss of consciousness.   Psych: Negative for anxiety, depression.   Heme/Lymph: Negative for easy bruising, bleeding, lymphadenopathy.      Objective   There were no vitals taken for this visit.    Physical Exam  General: Well appearing, well nourished, in no distress. Oriented x 3, normal mood and affect.  Skin: Good turgor, no rash, unusual bruising or prominent lesions.  Hair: Normal texture and distribution.  HEENT:  Head: Normocephalic, atraumatic.  Eyes: Conjunctiva clear, sclera non-icteric, EOM intact.  Nose: No external lesions.  Neck:  Supple.  Neurologic: Alert and oriented. No focal neurological deficits appreciated.   Psychiatric: Normal mood and affect.     Administrative Statements   Encounter provider Hawa Fagan MD    The Patient is located at Home and in the following state in which I hold an active license [place].    The patient was identified by name and date of birth. Marcella Avitia was informed that this is a telemedicine visit and that the visit is being conducted through the Epic Embedded platform. She agrees to proceed..  My office door was closed. No one else was in the room.  She acknowledged consent and understanding of privacy and security of the video platform. The patient has agreed to participate and understands they can discontinue the visit at any time.    I have spent a total time of 30 minutes in caring for this patient on the day of the visit/encounter including Prognosis, Risks and benefits of tx options, Instructions for management, Impressions, Documenting in the medical record, and Obtaining or reviewing history  , not including the time spent for establishing the audio/video connection.

## 2025-03-13 NOTE — ASSESSMENT & PLAN NOTE
Orders:    CBC and differential; Future    Comprehensive metabolic panel; Future    
Ms. 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  She had been receiving Remicade infusions 5 mg/kg every 6 weeks that was started in 1/2023 but this has been on hold since 8/24.     # Ankylosing spondylitis  - Marcella presents today for a follow-up of ankylosing spondylitis for which she had been on Remicade infusions 5 mg/kg every 6 weeks but this has been on hold since 8/24 as she was reporting palpitations that we wanted evaluated by cardiology.  She will drop off their records for my review.  She is also getting dental implants on 3/18 and following this she will review with her cardiologist if it may be reasonable to resume the infusions.  If cardiology would prefer we do not do this, then I will discuss an alternate biologic DMARD with her.    Orders:    C-reactive protein; Future    Sedimentation rate, automated; Future    
Spontaneous, unlabored and symmetrical

## 2025-07-02 DIAGNOSIS — M47.819 SPONDYLOARTHROPATHY: Primary | ICD-10-CM

## 2025-07-02 RX ORDER — DIPHENHYDRAMINE HCL 25 MG
25 TABLET ORAL ONCE
OUTPATIENT
Start: 2025-07-07

## 2025-07-02 RX ORDER — METHYLPREDNISOLONE SODIUM SUCCINATE 40 MG/ML
40 INJECTION, POWDER, LYOPHILIZED, FOR SOLUTION INTRAMUSCULAR; INTRAVENOUS ONCE
OUTPATIENT
Start: 2025-07-07

## 2025-07-02 RX ORDER — ACETAMINOPHEN 325 MG/1
650 TABLET ORAL ONCE
OUTPATIENT
Start: 2025-07-07

## 2025-07-02 RX ORDER — SODIUM CHLORIDE 9 MG/ML
20 INJECTION, SOLUTION INTRAVENOUS ONCE
OUTPATIENT
Start: 2025-07-07

## 2025-07-03 ENCOUNTER — TELEPHONE (OUTPATIENT)
Dept: RHEUMATOLOGY | Facility: CLINIC | Age: 50
End: 2025-07-03

## 2025-07-03 NOTE — TELEPHONE ENCOUNTER
Patient returning call, she states she can only have them done at [facility] in [place]. She did not have a fax, however provided phone number to call to get information: phone- [phone]    She will get bloodwork done on Monday, and will reschedule her infusion  for after she has it completed

## 2025-07-03 NOTE — TELEPHONE ENCOUNTER
Can you please call her to obtain the blood work I ordered in March as soon as possible, preferably before her infusion on 7/7.  She has not had a QuantiFERON TB test done in a few years and I would like updated results.

## 2025-07-03 NOTE — TELEPHONE ENCOUNTER
Left detailed message with Dr. Fagan's suggestions. Asked patient to call us back if she needed us to fax the order to an outside lab.

## 2025-07-08 ENCOUNTER — TELEPHONE (OUTPATIENT)
Dept: RHEUMATOLOGY | Facility: CLINIC | Age: 50
End: 2025-07-08

## 2025-07-08 NOTE — TELEPHONE ENCOUNTER
Patient Remicade auth NPI needs to be changed to the new NPI of [facility].

## 2025-07-08 NOTE — TELEPHONE ENCOUNTER
After being transferred multiple times and not getting through to the correct department, will need to call [phone] tomorrow to change NPI number on authorization.

## 2025-07-10 NOTE — TELEPHONE ENCOUNTER
Called and spoke to Tyrone. New authorization initiated with new NPI number.     Pending case number: [id number]    Fax clinicals: [phone]

## 2025-07-15 LAB
ALBUMIN SERPL-MCNC: 3.9 G/DL (ref 3.6–5.1)
ALBUMIN/GLOB SERPL: 1.3 (CALC) (ref 1–2.5)
ALP SERPL-CCNC: 78 U/L (ref 37–153)
ALT SERPL-CCNC: 12 U/L (ref 6–29)
AST SERPL-CCNC: 16 U/L (ref 10–35)
BASOPHILS # BLD AUTO: 41 CELLS/UL (ref 0–200)
BASOPHILS NFR BLD AUTO: 0.6 %
BILIRUB SERPL-MCNC: 0.3 MG/DL (ref 0.2–1.2)
BUN SERPL-MCNC: 11 MG/DL (ref 7–25)
BUN/CREAT SERPL: NORMAL (CALC) (ref 6–22)
CALCIUM SERPL-MCNC: 9.2 MG/DL (ref 8.6–10.4)
CHLORIDE SERPL-SCNC: 105 MMOL/L (ref 98–110)
CO2 SERPL-SCNC: 30 MMOL/L (ref 20–32)
CREAT SERPL-MCNC: 0.81 MG/DL (ref 0.5–1.03)
CRP SERPL-MCNC: 17.8 MG/L
EOSINOPHIL # BLD AUTO: 545 CELLS/UL (ref 15–500)
EOSINOPHIL NFR BLD AUTO: 7.9 %
ERYTHROCYTE [DISTWIDTH] IN BLOOD BY AUTOMATED COUNT: 12.8 % (ref 11–15)
ERYTHROCYTE [SEDIMENTATION RATE] IN BLOOD BY WESTERGREN METHOD: 29 MM/H
GFR/BSA.PRED SERPLBLD CYS-BASED-ARV: 88 ML/MIN/1.73M2
GLOBULIN SER CALC-MCNC: 3.1 G/DL (CALC) (ref 1.9–3.7)
GLUCOSE SERPL-MCNC: 98 MG/DL (ref 65–139)
HCT VFR BLD AUTO: 40.7 % (ref 35–45)
HGB BLD-MCNC: 13.3 G/DL (ref 11.7–15.5)
LYMPHOCYTES # BLD AUTO: 1891 CELLS/UL (ref 850–3900)
LYMPHOCYTES NFR BLD AUTO: 27.4 %
MCH RBC QN AUTO: 30.2 PG (ref 27–33)
MCHC RBC AUTO-ENTMCNC: 32.7 G/DL (ref 32–36)
MCV RBC AUTO: 92.5 FL (ref 80–100)
MONOCYTES # BLD AUTO: 504 CELLS/UL (ref 200–950)
MONOCYTES NFR BLD AUTO: 7.3 %
NEUTROPHILS # BLD AUTO: 3919 CELLS/UL (ref 1500–7800)
NEUTROPHILS NFR BLD AUTO: 56.8 %
PLATELET # BLD AUTO: 379 THOUSAND/UL (ref 140–400)
PMV BLD REES-ECKER: 10 FL (ref 7.5–12.5)
POTASSIUM SERPL-SCNC: 4 MMOL/L (ref 3.5–5.3)
PROT SERPL-MCNC: 7 G/DL (ref 6.1–8.1)
RBC # BLD AUTO: 4.4 MILLION/UL (ref 3.8–5.1)
SODIUM SERPL-SCNC: 139 MMOL/L (ref 135–146)
WBC # BLD AUTO: 6.9 THOUSAND/UL (ref 3.8–10.8)

## 2025-07-16 NOTE — TELEPHONE ENCOUNTER
Called and spoke to Franky VERMA, reference number [id number]    Medication: Remicade    Auth Approval Dates: 8/18/2025 to 8/17/2026    Pharmacy Filling: Buy and Bill    Authorization Number: [id number]    Servicing Provider: [facility]     Should be receiving approval letter to [phone], will scanned into media once received.

## 2025-08-14 ENCOUNTER — TELEPHONE (OUTPATIENT)
Dept: RHEUMATOLOGY | Facility: CLINIC | Age: 50
End: 2025-08-14

## 2025-08-15 ENCOUNTER — TELEPHONE (OUTPATIENT)
Dept: RHEUMATOLOGY | Facility: CLINIC | Age: 50
End: 2025-08-15

## 2025-08-18 ENCOUNTER — HOSPITAL ENCOUNTER (OUTPATIENT)
Dept: INFUSION CENTER | Facility: CLINIC | Age: 50
Discharge: HOME | End: 2025-08-18
Attending: INTERNAL MEDICINE

## 2025-08-18 VITALS
RESPIRATION RATE: 18 BRPM | TEMPERATURE: 98 F | DIASTOLIC BLOOD PRESSURE: 98 MMHG | HEART RATE: 85 BPM | SYSTOLIC BLOOD PRESSURE: 135 MMHG

## 2025-08-18 DIAGNOSIS — M47.819 SPONDYLOARTHROPATHY: ICD-10-CM
